# Patient Record
Sex: MALE | Race: WHITE | Employment: OTHER | URBAN - METROPOLITAN AREA
[De-identification: names, ages, dates, MRNs, and addresses within clinical notes are randomized per-mention and may not be internally consistent; named-entity substitution may affect disease eponyms.]

---

## 2020-12-19 ENCOUNTER — APPOINTMENT (OUTPATIENT)
Dept: GENERAL RADIOLOGY | Facility: CLINIC | Age: 75
DRG: 481 | End: 2020-12-19
Attending: EMERGENCY MEDICINE
Payer: MEDICARE

## 2020-12-19 ENCOUNTER — ANESTHESIA EVENT (OUTPATIENT)
Dept: SURGERY | Facility: CLINIC | Age: 75
DRG: 481 | End: 2020-12-19
Payer: MEDICARE

## 2020-12-19 ENCOUNTER — ANESTHESIA (OUTPATIENT)
Dept: SURGERY | Facility: CLINIC | Age: 75
DRG: 481 | End: 2020-12-19
Payer: MEDICARE

## 2020-12-19 ENCOUNTER — HOSPITAL ENCOUNTER (INPATIENT)
Facility: CLINIC | Age: 75
LOS: 3 days | Discharge: NURSING, REHAB OR LONG-TERM CARE | DRG: 481 | End: 2020-12-22
Attending: EMERGENCY MEDICINE | Admitting: STUDENT IN AN ORGANIZED HEALTH CARE EDUCATION/TRAINING PROGRAM
Payer: MEDICARE

## 2020-12-19 ENCOUNTER — APPOINTMENT (OUTPATIENT)
Dept: CT IMAGING | Facility: CLINIC | Age: 75
DRG: 481 | End: 2020-12-19
Attending: EMERGENCY MEDICINE
Payer: MEDICARE

## 2020-12-19 DIAGNOSIS — S72.142A CLOSED DISPLACED INTERTROCHANTERIC FRACTURE OF LEFT FEMUR, INITIAL ENCOUNTER (H): ICD-10-CM
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
## 2020-12-19 DIAGNOSIS — S80.212A KNEE ABRASION, LEFT, INITIAL ENCOUNTER: ICD-10-CM

## 2020-12-19 DIAGNOSIS — Z79.01 LONG TERM CURRENT USE OF ANTICOAGULANT THERAPY: ICD-10-CM

## 2020-12-19 DIAGNOSIS — I48.20 CHRONIC ATRIAL FIBRILLATION (H): ICD-10-CM

## 2020-12-19 PROBLEM — E05.00 TOXIC DIFFUSE GOITER WITHOUT CRISIS: Status: ACTIVE | Noted: 2020-12-19

## 2020-12-19 PROBLEM — G60.9 HEREDITARY AND IDIOPATHIC PERIPHERAL NEUROPATHY: Status: ACTIVE | Noted: 2020-12-19

## 2020-12-19 PROBLEM — R61 EXCESSIVE SWEATING: Status: ACTIVE | Noted: 2020-12-19

## 2020-12-19 PROBLEM — S99.919A INJURY, OTHER AND UNSPECIFIED, KNEE, LEG, ANKLE, AND FOOT: Status: ACTIVE | Noted: 2020-12-19

## 2020-12-19 PROBLEM — E78.5 OTHER AND UNSPECIFIED HYPERLIPIDEMIA: Status: ACTIVE | Noted: 2020-12-19

## 2020-12-19 PROBLEM — H26.9 CATARACT: Status: ACTIVE | Noted: 2020-12-19

## 2020-12-19 PROBLEM — S99.929A INJURY, OTHER AND UNSPECIFIED, KNEE, LEG, ANKLE, AND FOOT: Status: ACTIVE | Noted: 2020-12-19

## 2020-12-19 PROBLEM — R91.1 SOLITARY PULMONARY NODULE: Status: ACTIVE | Noted: 2020-12-19

## 2020-12-19 PROBLEM — R00.2 PALPITATIONS: Status: ACTIVE | Noted: 2020-12-19

## 2020-12-19 PROBLEM — B35.3 TINEA PEDIS: Status: ACTIVE | Noted: 2020-12-19

## 2020-12-19 PROBLEM — S89.90XA INJURY, OTHER AND UNSPECIFIED, KNEE, LEG, ANKLE, AND FOOT: Status: ACTIVE | Noted: 2020-12-19

## 2020-12-19 PROBLEM — H52.4 PRESBYOPIA: Status: ACTIVE | Noted: 2020-12-19

## 2020-12-19 PROBLEM — I71.40 ABDOMINAL AORTIC ANEURYSM (AAA) (H): Status: ACTIVE | Noted: 2020-12-19

## 2020-12-19 PROBLEM — Z13.89 SPECIAL SCREENING FOR OTHER CONDITIONS: Status: ACTIVE | Noted: 2020-12-19

## 2020-12-19 PROBLEM — Z87.891 PERSONAL HISTORY OF TOBACCO USE, PRESENTING HAZARDS TO HEALTH: Status: ACTIVE | Noted: 2020-12-19

## 2020-12-19 PROBLEM — I10 ESSENTIAL HYPERTENSION: Status: ACTIVE | Noted: 2020-12-19

## 2020-12-19 PROBLEM — H52.209 ASTIGMATISM: Status: ACTIVE | Noted: 2020-12-19

## 2020-12-19 PROBLEM — H40.009 BORDERLINE GLAUCOMA: Status: ACTIVE | Noted: 2020-12-19

## 2020-12-19 PROBLEM — E11.9 TYPE 2 DIABETES MELLITUS (H): Status: ACTIVE | Noted: 2020-12-19

## 2020-12-19 PROBLEM — E03.9 HYPOTHYROIDISM: Status: ACTIVE | Noted: 2020-12-19

## 2020-12-19 LAB
ABO + RH BLD: NORMAL
ABO + RH BLD: NORMAL
ANION GAP SERPL CALCULATED.3IONS-SCNC: 7 MMOL/L (ref 3–14)
APTT PPP: 37 SEC (ref 22–37)
BASOPHILS # BLD AUTO: 0 10E9/L (ref 0–0.2)
BASOPHILS NFR BLD AUTO: 0.2 %
BLD GP AB SCN SERPL QL: NORMAL
BLOOD BANK CMNT PATIENT-IMP: NORMAL
BUN SERPL-MCNC: 9 MG/DL (ref 7–30)
CALCIUM SERPL-MCNC: 8.6 MG/DL (ref 8.5–10.1)
CHLORIDE SERPL-SCNC: 106 MMOL/L (ref 94–109)
CO2 SERPL-SCNC: 25 MMOL/L (ref 20–32)
CREAT SERPL-MCNC: 1 MG/DL (ref 0.66–1.25)
DIFFERENTIAL METHOD BLD: ABNORMAL
EOSINOPHIL # BLD AUTO: 0 10E9/L (ref 0–0.7)
EOSINOPHIL NFR BLD AUTO: 0 %
ERYTHROCYTE [DISTWIDTH] IN BLOOD BY AUTOMATED COUNT: 12.6 % (ref 10–15)
FLUAV+FLUBV RNA SPEC QL NAA+PROBE: NEGATIVE
FLUAV+FLUBV RNA SPEC QL NAA+PROBE: NEGATIVE
GFR SERPL CREATININE-BSD FRML MDRD: 74 ML/MIN/{1.73_M2}
GLUCOSE SERPL-MCNC: 116 MG/DL (ref 70–99)
HCT VFR BLD AUTO: 40.5 % (ref 40–53)
HGB BLD-MCNC: 13.6 G/DL (ref 13.3–17.7)
IMM GRANULOCYTES # BLD: 0.1 10E9/L (ref 0–0.4)
IMM GRANULOCYTES NFR BLD: 0.5 %
INR PPP: 1.27 (ref 0.86–1.14)
LABORATORY COMMENT REPORT: NORMAL
LYMPHOCYTES # BLD AUTO: 1.1 10E9/L (ref 0.8–5.3)
LYMPHOCYTES NFR BLD AUTO: 9.8 %
MCH RBC QN AUTO: 35.6 PG (ref 26.5–33)
MCHC RBC AUTO-ENTMCNC: 33.6 G/DL (ref 31.5–36.5)
MCV RBC AUTO: 106 FL (ref 78–100)
MONOCYTES # BLD AUTO: 0.9 10E9/L (ref 0–1.3)
MONOCYTES NFR BLD AUTO: 8.1 %
NEUTROPHILS # BLD AUTO: 9.3 10E9/L (ref 1.6–8.3)
NEUTROPHILS NFR BLD AUTO: 81.4 %
NRBC # BLD AUTO: 0 10*3/UL
NRBC BLD AUTO-RTO: 0 /100
PLATELET # BLD AUTO: 234 10E9/L (ref 150–450)
POTASSIUM SERPL-SCNC: 4.1 MMOL/L (ref 3.4–5.3)
RBC # BLD AUTO: 3.82 10E12/L (ref 4.4–5.9)
RSV RNA SPEC QL NAA+PROBE: NEGATIVE
SARS-COV-2 RNA SPEC QL NAA+PROBE: NEGATIVE
SODIUM SERPL-SCNC: 138 MMOL/L (ref 133–144)
SPECIMEN EXP DATE BLD: NORMAL
SPECIMEN SOURCE: NORMAL
WBC # BLD AUTO: 11.4 10E9/L (ref 4–11)

## 2020-12-19 PROCEDURE — 87636 SARSCOV2 & INF A&B AMP PRB: CPT | Performed by: EMERGENCY MEDICINE

## 2020-12-19 PROCEDURE — 86900 BLOOD TYPING SEROLOGIC ABO: CPT | Performed by: EMERGENCY MEDICINE

## 2020-12-19 PROCEDURE — 120N000001 HC R&B MED SURG/OB

## 2020-12-19 PROCEDURE — 85610 PROTHROMBIN TIME: CPT | Performed by: EMERGENCY MEDICINE

## 2020-12-19 PROCEDURE — 86850 RBC ANTIBODY SCREEN: CPT | Performed by: EMERGENCY MEDICINE

## 2020-12-19 PROCEDURE — 258N000003 HC RX IP 258 OP 636: Performed by: EMERGENCY MEDICINE

## 2020-12-19 PROCEDURE — C9803 HOPD COVID-19 SPEC COLLECT: HCPCS

## 2020-12-19 PROCEDURE — 85730 THROMBOPLASTIN TIME PARTIAL: CPT | Performed by: EMERGENCY MEDICINE

## 2020-12-19 PROCEDURE — 80048 BASIC METABOLIC PNL TOTAL CA: CPT | Performed by: EMERGENCY MEDICINE

## 2020-12-19 PROCEDURE — 250N000013 HC RX MED GY IP 250 OP 250 PS 637: Performed by: STUDENT IN AN ORGANIZED HEALTH CARE EDUCATION/TRAINING PROGRAM

## 2020-12-19 PROCEDURE — 96374 THER/PROPH/DIAG INJ IV PUSH: CPT

## 2020-12-19 PROCEDURE — 71045 X-RAY EXAM CHEST 1 VIEW: CPT

## 2020-12-19 PROCEDURE — 85025 COMPLETE CBC W/AUTO DIFF WBC: CPT | Performed by: EMERGENCY MEDICINE

## 2020-12-19 PROCEDURE — 70450 CT HEAD/BRAIN W/O DYE: CPT

## 2020-12-19 PROCEDURE — 93005 ELECTROCARDIOGRAM TRACING: CPT

## 2020-12-19 PROCEDURE — 99223 1ST HOSP IP/OBS HIGH 75: CPT | Mod: AI | Performed by: STUDENT IN AN ORGANIZED HEALTH CARE EDUCATION/TRAINING PROGRAM

## 2020-12-19 PROCEDURE — 96361 HYDRATE IV INFUSION ADD-ON: CPT

## 2020-12-19 PROCEDURE — 99285 EMERGENCY DEPT VISIT HI MDM: CPT | Mod: 25

## 2020-12-19 PROCEDURE — 250N000011 HC RX IP 250 OP 636: Performed by: EMERGENCY MEDICINE

## 2020-12-19 PROCEDURE — 86901 BLOOD TYPING SEROLOGIC RH(D): CPT | Performed by: EMERGENCY MEDICINE

## 2020-12-19 PROCEDURE — 73502 X-RAY EXAM HIP UNI 2-3 VIEWS: CPT

## 2020-12-19 RX ORDER — LEVOTHYROXINE SODIUM 112 UG/1
112 TABLET ORAL DAILY
Status: DISCONTINUED | OUTPATIENT
Start: 2020-12-20 | End: 2020-12-22 | Stop reason: HOSPADM

## 2020-12-19 RX ORDER — NALOXONE HYDROCHLORIDE 0.4 MG/ML
0.4 INJECTION, SOLUTION INTRAMUSCULAR; INTRAVENOUS; SUBCUTANEOUS
Status: DISCONTINUED | OUTPATIENT
Start: 2020-12-19 | End: 2020-12-22 | Stop reason: HOSPADM

## 2020-12-19 RX ORDER — LACTULOSE 10 G/15ML
10 SOLUTION ORAL 2 TIMES DAILY PRN
COMMUNITY
End: 2020-12-24

## 2020-12-19 RX ORDER — ACETAMINOPHEN 500 MG
500-1000 TABLET ORAL EVERY 8 HOURS PRN
Status: ON HOLD | COMMUNITY
End: 2020-12-21

## 2020-12-19 RX ORDER — SIMVASTATIN 80 MG
TABLET ORAL AT BEDTIME
COMMUNITY

## 2020-12-19 RX ORDER — SIMVASTATIN 40 MG
80 TABLET ORAL AT BEDTIME
Status: DISCONTINUED | OUTPATIENT
Start: 2020-12-19 | End: 2020-12-22 | Stop reason: HOSPADM

## 2020-12-19 RX ORDER — HYDROCODONE BITARTRATE AND ACETAMINOPHEN 5; 325 MG/1; MG/1
1-2 TABLET ORAL EVERY 4 HOURS PRN
Status: DISCONTINUED | OUTPATIENT
Start: 2020-12-19 | End: 2020-12-20 | Stop reason: ALTCHOICE

## 2020-12-19 RX ORDER — NALOXONE HYDROCHLORIDE 0.4 MG/ML
0.2 INJECTION, SOLUTION INTRAMUSCULAR; INTRAVENOUS; SUBCUTANEOUS
Status: DISCONTINUED | OUTPATIENT
Start: 2020-12-19 | End: 2020-12-22 | Stop reason: HOSPADM

## 2020-12-19 RX ORDER — METOPROLOL SUCCINATE 25 MG/1
25 TABLET, EXTENDED RELEASE ORAL DAILY
COMMUNITY

## 2020-12-19 RX ORDER — ACETAMINOPHEN 325 MG/1
650 TABLET ORAL EVERY 4 HOURS PRN
Status: DISCONTINUED | OUTPATIENT
Start: 2020-12-19 | End: 2020-12-19 | Stop reason: ALTCHOICE

## 2020-12-19 RX ORDER — METOPROLOL SUCCINATE 50 MG/1
50 TABLET, EXTENDED RELEASE ORAL DAILY
Status: DISCONTINUED | OUTPATIENT
Start: 2020-12-20 | End: 2020-12-19

## 2020-12-19 RX ORDER — METHOCARBAMOL 500 MG/1
500 TABLET, FILM COATED ORAL 2 TIMES DAILY PRN
COMMUNITY
End: 2021-01-05

## 2020-12-19 RX ORDER — ONDANSETRON 2 MG/ML
4 INJECTION INTRAMUSCULAR; INTRAVENOUS EVERY 6 HOURS PRN
Status: DISCONTINUED | OUTPATIENT
Start: 2020-12-19 | End: 2020-12-20

## 2020-12-19 RX ORDER — SODIUM CHLORIDE 9 MG/ML
INJECTION, SOLUTION INTRAVENOUS ONCE
Status: COMPLETED | OUTPATIENT
Start: 2020-12-19 | End: 2020-12-19

## 2020-12-19 RX ORDER — LIDOCAINE 40 MG/G
CREAM TOPICAL
Status: DISCONTINUED | OUTPATIENT
Start: 2020-12-19 | End: 2020-12-20

## 2020-12-19 RX ORDER — AMOXICILLIN 250 MG
1 CAPSULE ORAL 2 TIMES DAILY
COMMUNITY
End: 2020-12-29

## 2020-12-19 RX ORDER — ACETAMINOPHEN 500 MG
500-1000 TABLET ORAL EVERY 8 HOURS PRN
Status: DISCONTINUED | OUTPATIENT
Start: 2020-12-19 | End: 2020-12-20

## 2020-12-19 RX ORDER — FENTANYL CITRATE 50 UG/ML
50 INJECTION, SOLUTION INTRAMUSCULAR; INTRAVENOUS ONCE
Status: COMPLETED | OUTPATIENT
Start: 2020-12-19 | End: 2020-12-19

## 2020-12-19 RX ORDER — ONDANSETRON 4 MG/1
4 TABLET, ORALLY DISINTEGRATING ORAL EVERY 6 HOURS PRN
Status: DISCONTINUED | OUTPATIENT
Start: 2020-12-19 | End: 2020-12-20

## 2020-12-19 RX ORDER — LISINOPRIL 5 MG/1
2.5 TABLET ORAL DAILY
COMMUNITY
End: 2021-01-27

## 2020-12-19 RX ORDER — SILDENAFIL 100 MG/1
100 TABLET, FILM COATED ORAL DAILY PRN
COMMUNITY

## 2020-12-19 RX ORDER — METOPROLOL SUCCINATE 50 MG/1
50 TABLET, EXTENDED RELEASE ORAL DAILY
Status: DISCONTINUED | OUTPATIENT
Start: 2020-12-19 | End: 2020-12-22 | Stop reason: HOSPADM

## 2020-12-19 RX ORDER — AMLODIPINE BESYLATE 10 MG/1
2.5 TABLET ORAL DAILY
COMMUNITY
End: 2021-01-05

## 2020-12-19 RX ORDER — TAMSULOSIN HYDROCHLORIDE 0.4 MG/1
0.4 CAPSULE ORAL DAILY
COMMUNITY
End: 2020-12-19

## 2020-12-19 RX ORDER — LISINOPRIL 20 MG/1
20 TABLET ORAL DAILY
Status: DISCONTINUED | OUTPATIENT
Start: 2020-12-20 | End: 2020-12-22 | Stop reason: HOSPADM

## 2020-12-19 RX ORDER — AMLODIPINE BESYLATE 10 MG/1
10 TABLET ORAL DAILY
Status: DISCONTINUED | OUTPATIENT
Start: 2020-12-20 | End: 2020-12-22 | Stop reason: HOSPADM

## 2020-12-19 RX ORDER — LEVOTHYROXINE SODIUM 112 UG/1
112 TABLET ORAL DAILY
COMMUNITY

## 2020-12-19 RX ADMIN — METOPROLOL SUCCINATE 50 MG: 50 TABLET, EXTENDED RELEASE ORAL at 14:39

## 2020-12-19 RX ADMIN — SIMVASTATIN 80 MG: 40 TABLET, FILM COATED ORAL at 21:54

## 2020-12-19 RX ADMIN — FENTANYL CITRATE 50 MCG: 50 INJECTION, SOLUTION INTRAMUSCULAR; INTRAVENOUS at 10:01

## 2020-12-19 RX ADMIN — HYDROCODONE BITARTRATE AND ACETAMINOPHEN 2 TABLET: 5; 325 TABLET ORAL at 17:40

## 2020-12-19 RX ADMIN — HYDROCODONE BITARTRATE AND ACETAMINOPHEN 1 TABLET: 5; 325 TABLET ORAL at 13:31

## 2020-12-19 RX ADMIN — SODIUM CHLORIDE: 9 INJECTION, SOLUTION INTRAVENOUS at 08:28

## 2020-12-19 RX ADMIN — HYDROCODONE BITARTRATE AND ACETAMINOPHEN 1 TABLET: 5; 325 TABLET ORAL at 12:34

## 2020-12-19 RX ADMIN — HYDROCODONE BITARTRATE AND ACETAMINOPHEN 2 TABLET: 5; 325 TABLET ORAL at 21:54

## 2020-12-19 ASSESSMENT — ENCOUNTER SYMPTOMS
VOMITING: 0
ROS SKIN COMMENTS: ABRASION
DYSRHYTHMIAS: 1
FEVER: 0
DIARRHEA: 0
NAUSEA: 0
COUGH: 0
CHILLS: 0
ARTHRALGIAS: 1

## 2020-12-19 ASSESSMENT — ACTIVITIES OF DAILY LIVING (ADL)
DIFFICULTY_EATING/SWALLOWING: NO
DIFFICULTY_COMMUNICATING: NO
DRESSING/BATHING_DIFFICULTY: NO
WHICH_OF_THE_ABOVE_FUNCTIONAL_RISKS_HAD_A_RECENT_ONSET_OR_CHANGE?: AMBULATION
ADLS_ACUITY_SCORE: 19
WEAR_GLASSES_OR_BLIND: YES
VISION_MANAGEMENT: GLASSES
ADLS_ACUITY_SCORE: 15
HEARING_DIFFICULTY_OR_DEAF: NO
ADLS_ACUITY_SCORE: 15
TOILETING_ISSUES: NO

## 2020-12-19 NOTE — ED NOTES
[facility]  ED Nurse Handoff Report    ED Chief complaint: Hip Pain and Fall      ED Diagnosis:   Final diagnoses:   Closed displaced intertrochanteric fracture of left femur, initial encounter (H)   Fall, initial encounter   Knee abrasion, left, initial encounter   Long term current use of anticoagulant therapy   Chronic atrial fibrillation (H)       Code Status: Full Code    Allergies: No Known Allergies    Patient Story: Patient fell this AM, left hip fx.  [facility]  Focused Assessment: Alert and oriented, pain is controled.  Previously independent.     Treatments and/or interventions provided: labs, imaging  Patient's response to treatments and/or interventions:   Labs Ordered and Resulted from Time of ED Arrival Up to the Time of Departure from the ED   CBC WITH PLATELETS DIFFERENTIAL - Abnormal; Notable for the following components:       Result Value    WBC 11.4 (*)     RBC Count 3.82 (*)      (*)     MCH 35.6 (*)     Absolute Neutrophil 9.3 (*)     All other components within normal limits   INR - Abnormal; Notable for the following components:    INR 1.27 (*)     All other components within normal limits   BASIC METABOLIC PANEL - Abnormal; Notable for the following components:    Glucose 116 (*)     All other components within normal limits   PARTIAL THROMBOPLASTIN TIME   INFLUENZA A/B & SARS-COV2 PCR MULTIPLEX   PERIPHERAL IV CATHETER   ABO/RH TYPE AND SCREEN         To be done/followed up on inpatient unit:      Does this patient have any cognitive concerns?: none    Activity level - Baseline/Home:  Independent  Activity Level - Current:   Total Care    Patient's Preferred language: English   Needed?: No    Isolation: None  Infection: Not Applicable  Patient tested for COVID 19 prior to admission: YES  Bariatric?: No    Vital Signs:   Vitals:    12/19/20 0823 12/19/20 1000 12/19/20 1002   BP: 100/81 (!) 104/90    Pulse: 98 79    Resp: 20     Temp: 98.8  F  (37.1  C)     TempSrc: Oral     SpO2: 99%  96%       Cardiac Rhythm:     Was the PSS-3 completed:   Yes  What interventions are required if any?               Family Comments:   OBS brochure/video discussed/provided to patient/family: N/A              Name of person given brochure if not patient:               Relationship to patient:     For the majority of the shift this patient's behavior was Green.   Behavioral interventions performed were .    ED NURSE PHONE NUMBER: [id number]

## 2020-12-19 NOTE — ED TRIAGE NOTES
Pt arrives via EMS for evaluation following fall. Pt fell today getting into bed. Pt reports left hip pain. Pt is on blood thinners. Pt denies hitting head. 1mg of Dilaudid given en route to hospital.

## 2020-12-19 NOTE — ED NOTES
Bed: ED26  Expected date:   Expected time:   Means of arrival:   Comments:  Chiquita 423 Fall hip pain  80m

## 2020-12-19 NOTE — ED PROVIDER NOTES
History   Chief Complaint:  Hip Pain and Fall    HPI   Braxton Mantilla is a 75 year old male with history of Xarelto use and ruptured quadriceps who presents with hip pain and fall. Arriving via EMS, the patient was at home in [facility] backing up into his bed when his knees buckled and he fell, resulting in left hip pain. The patient was able to twist and crawl onto the bed. EMS gave the patient 1 mg dilaudid IV. During evaluation, patient is unsure if he bumped his head and is noted to have an abrasion over his left knee, which he attributes to crawling. He denies COVID symptoms, including fever, cough, chills, nausea, emesis, and diarrhea.    Review of Systems   Constitutional: Negative for chills and fever.   Respiratory: Negative for cough.    Gastrointestinal: Negative for diarrhea, nausea and vomiting.   Musculoskeletal: Positive for arthralgias.   Skin:        abrasion   All other systems reviewed and are negative.        Allergies:  No known drug allergies    Medications:  Xarelto    Past Medical History:    Ruptured quadriceps   Anticoagulant use    Social History:  , served in [place] [employer].    Physical Exam     Patient Vitals for the past 24 hrs:   BP Temp Temp src Pulse Resp SpO2 Weight   12/19/20 1331 -- -- -- -- 16 -- --   12/19/20 1305 -- -- -- -- 16 -- --   12/19/20 1234 -- -- -- -- 16 -- --   12/19/20 1150 132/75 98.4  F (36.9  C) Oral 93 16 98 % --   12/19/20 1045 (!) 133/99 -- -- 91 -- -- 65.8 kg (145 lb)   12/19/20 1030 (!) 133/94 -- -- 91 -- -- --   12/19/20 1015 (!) 119/90 -- -- 100 -- -- --   12/19/20 1002 -- -- -- -- -- 96 % --   12/19/20 1000 (!) 104/90 -- -- 79 -- -- --   12/19/20 0823 100/81 98.8  F (37.1  C) Oral 98 20 99 % --       Physical Exam  Nursing note and vitals reviewed.    Constitutional:  Appears comfortable.    HENT:    Nose normal.  No discharge.      Oral mucosa is moist.     No obvious swelling or tenderness to scalp.  Eyes:    Conjunctivae are normal  without injection.     Pupils are equal.  Lymph:  No enlarged or tender cervical or submandibular lymph nodes.   Cardiovascular:  Normal rate, regular rhythm with normal S1 and S2.      Normal heart sounds and peripheral pulses 2+ and equal.       No murmur or petra.  Pulmonary:  Effort normal and breath sounds clear to auscultation bilaterally.     No respiratory distress.  No stridor.     No wheezes. No rales.     GI:    Soft. No distension and no mass. No tenderness.      No rebound and no guarding. No flank pain.  No HSM.  Musculoskeletal:  Left leg is shortened and externally rotated. Tenderness to movement and palpation. Old ecchymosis to lateral proximal thighs.  Neurological:   Alert and oriented. No focal weakness.     Exhibits good muscle tone. Coordination normal.      GCS eye subscore is 4. GCS verbal subscore is 5.      GCS motor subscore is 6.   Skin:    Skin is warm and dry. Abrasion over the left anterior knee. No diaphoresis.      No erythema. No pallor.  No lesions.  Psychiatric:   Behavior is normal. Appropriate mood and affect.     Judgment and thought content normal.     Emergency Department Course   ECG (09:15:27):  Rate 89 bpm. MI interval *. QRS duration 94. QT/QTc 388/472. P-R-T axes * 49 38. Atrial fibrillation with a competing junctional pacemaker. Incomplete right bundle branch block. ST & T wave abnormality, consider anterior ischemia. Abnormal ECG. Interpreted at 0920 by Pilar Logan MD.    Imaging:  XR Chest 1 View  IMPRESSION:   1. Patchy left basilar opacities, may represent atelectasis or pneumonitis and follow-up is recommended to assess for resolution.   2. No pleural effusion or pneumothorax.   3. Cardiomediastinal silhouette unremarkable.     As read by Radiology.    XR Pelvis w Hip Left 1 View  IMPRESSION: Mild to moderate displacement of left intertrochanteric fracture. Hip joint spaces are fairly well preserved. Aortoiliac stent Seen.    As read by Radiology.    CT  Head wo Contrast  IMPRESSION:   1. No acute cranial process.  2. No fractures  3. Mild chronic-appearing ischemic changes intracranially as above,  age appropriate.  4. No extra-axial blood products/fluid collections are noted.    As read by Radiology.    Laboratory:  CBC: WBC 11.4 (H), o/w WNL (HGB 13.6, )  BMP: Glucose 116 (H), o/w WNL (Creatinine 1.00)  INR: 1.27 (H)  PTT: 37  ABO/Rh: ABO O, Rh Pos, Antibody Neg.    Asymptomatic Influenza A/B & COVID-19 Virus PCR Multiplex: Pending     Emergency Department Course:  Reviewed:  I reviewed the patient's nursing notes, vitals, past medical records, Care Everywhere.     Assessments:  0815: I performed an exam of the patient and obtained history, as documented above.  0912: I rechecked the patient. Explained finding to patient.    Consults:  0937: Discussed the patient with orthopedic, Dr. Parisi.  1018: Discussed the patient with hospitalist, Dr. Rea.    Interventions:  0828 Sodium chloride IV  1001 Fentanyl 50 mcg IV    Disposition:  The patient was admitted to the hospital under the care of Dr. Rea.     Impression & Plan   Medical Decision Making:  Patient comes in after falling trying to sit on a chair.  He has pain in his left hip with shortening and external rotation.  X-ray shows a intertrochanteric fracture that is displaced.  Routine labs and EKG are obtained his EKG was unremarkable other than chronic A. fib, basic metabolic panel and CBC unremarkable.  His MCV is elevated at 106 and white count slightly up at 11.4.  I did a head CT because he did think he bumped his head and he is on blood thinners and it was negative.  I talked with the orthopedic surgeon and he will plan to operate on the patient's hip probably in the morning tomorrow.  Dr. Rea is the admitting hospitalist.  He did receive some fentanyl while in the emergency room for pain.    Covid-19  Braxton Mantilla was evaluated during a global COVID-19 pandemic, which necessitated  consideration that the patient might be at risk for infection with the SARS-CoV-2 virus that causes COVID-19.   Applicable protocols for evaluation were followed during the patient's care.   COVID-19 was considered as part of the patient's evaluation. The plan for testing is:  a test was obtained during this visit.    Diagnosis:    ICD-10-CM    1. Closed displaced intertrochanteric fracture of left femur, initial encounter (H)  S72.142A ABO/Rh type and screen     Asymptomatic Influenza A/B & SARS-CoV2 (COVID-19) Virus PCR Multiplex   2. Fall, initial encounter  W19.XXXA    3. Knee abrasion, left, initial encounter  S80.212A    4. Long term current use of anticoagulant therapy  Z79.01    5. Chronic atrial fibrillation (H)  I48.20        Discharge Medications:  Current Discharge Medication List          Scribe Disclosure:  Seema ALEX, am serving as a scribe at 8:15 AM on 12/19/2020 to document services personally performed by Pilar Logan MD based on my observations and the provider's statements to me.       Pilar Logan MD  12/19/20 1388

## 2020-12-19 NOTE — PLAN OF CARE
Arrived on unit from ER at 12 pm.  Alert and oriented x's 4.  Taking Norco for pain with adequate relief.  Bedrest today.  No void since arrival to unit.  Bilateral peripheral neuropathy per patient.  NPO for surgery this evening.  Evening RN to follow.  Will continue to monitor.

## 2020-12-19 NOTE — PHARMACY-ADMISSION MEDICATION HISTORY
Pharmacy Medication History  Admission medication history interview status for the 12/19/2020  admission is complete. See EPIC admission navigator for prior to admission medications       Medication history sources: Patient, Surescripts, Care Everywhere and [facility]  Location of interview: Patient room - had on mask and faceshield  Adherence Assessment: Good    Significant changes made to the medication list:  Added all medications    Additional medication history information:   Medication list is from [facility] and patient was able to verify those medications off of my list. Per the [facility], he is to be taking tamsulosin daily, but the patient states he stopped taking this a few months ago due to a side effect of dizziness.  Also per patient, he has been having difficulty with constipation recently, so he takes 1 tablespoon of lactulose and 1 Senokot tablet every day. If he continues to have constipation for a couple of days he will increase both of them to twice daily until it is under control.     Medication reconciliation completed by provider prior to medication history? No    Time spent in this activity: 20 mins      Prior to Admission medications    Medication Sig Last Dose Taking? Auth Provider   acetaminophen (TYLENOL) 500 MG tablet Take 500-1,000 mg by mouth every 8 hours as needed for mild pain  at prn Yes Unknown, Entered By History   amLODIPine (NORVASC) 10 MG tablet Take 10 mg by mouth daily 12/18/2020 at Unknown time Yes Unknown, Entered By History   lactulose (CHRONULAC) 10 GM/15ML solution Take 10 g by mouth 2 times daily as needed for constipation  at prn Yes Unknown, Entered By History   levothyroxine (SYNTHROID/LEVOTHROID) 112 MCG tablet Take 112 mcg by mouth daily 12/18/2020 at Unknown time Yes Unknown, Entered By History   lisinopril (ZESTRIL) 40 MG tablet Take 20 mg by mouth daily 12/18/2020 at Unknown time Yes Unknown, Entered By History   methocarbamol (ROBAXIN) 500 MG  tablet Take 500 mg by mouth 2 times daily as needed for muscle spasms  at prn Yes Unknown, Entered By History   metoprolol succinate ER (TOPROL-XL) 100 MG 24 hr tablet Take 50 mg by mouth daily 12/18/2020 at Unknown time Yes Unknown, Entered By History   rivaroxaban ANTICOAGULANT (XARELTO) 20 MG TABS tablet Take 20 mg by mouth daily (with dinner) 12/18/2020 at Unknown time Yes Unknown, Entered By History   senna-docusate (SENOKOT-S/PERICOLACE) 8.6-50 MG tablet Take 1 tablet by mouth daily And daily PRN 12/18/2020 at Unknown time Yes Unknown, Entered By History   sildenafil (VIAGRA) 100 MG tablet Take 100 mg by mouth daily as needed  at prn Yes Unknown, Entered By History   simvastatin (ZOCOR) 80 MG tablet Take by mouth At Bedtime 12/18/2020 at Unknown time Yes Unknown, Entered By History       The information provided in this note is only as accurate as the sources available at the time of the update(s).

## 2020-12-19 NOTE — PROGRESS NOTES
RECEIVING UNIT ED HANDOFF REVIEW    ED Nurse Handoff Report was reviewed by: Danielle Lees RN on December 19, 2020 at 11:23 AM

## 2020-12-19 NOTE — PLAN OF CARE
76 yo male with left pertrochanteric hip fracture    - To OR later today if medically optimized per hospitalist team  - NPO   - Bedrest  - Pain control  - Full consult note to follow

## 2020-12-19 NOTE — PROGRESS NOTES
RECEIVING UNIT ED HANDOFF REVIEW    ED Nurse Handoff Report was reviewed by: Lenny Vicente RN on December 19, 2020 at 11:20 AM

## 2020-12-20 ENCOUNTER — APPOINTMENT (OUTPATIENT)
Dept: GENERAL RADIOLOGY | Facility: CLINIC | Age: 75
DRG: 481 | End: 2020-12-20
Attending: ORTHOPAEDIC SURGERY
Payer: MEDICARE

## 2020-12-20 LAB
ANION GAP SERPL CALCULATED.3IONS-SCNC: 5 MMOL/L (ref 3–14)
BUN SERPL-MCNC: 11 MG/DL (ref 7–30)
CALCIUM SERPL-MCNC: 8.6 MG/DL (ref 8.5–10.1)
CHLORIDE SERPL-SCNC: 106 MMOL/L (ref 94–109)
CO2 SERPL-SCNC: 28 MMOL/L (ref 20–32)
CREAT SERPL-MCNC: 0.93 MG/DL (ref 0.66–1.25)
ERYTHROCYTE [DISTWIDTH] IN BLOOD BY AUTOMATED COUNT: 12.8 % (ref 10–15)
GFR SERPL CREATININE-BSD FRML MDRD: 81 ML/MIN/{1.73_M2}
GLUCOSE SERPL-MCNC: 87 MG/DL (ref 70–99)
HCT VFR BLD AUTO: 39.3 % (ref 40–53)
HGB BLD-MCNC: 12.8 G/DL (ref 13.3–17.7)
INTERPRETATION ECG - MUSE: NORMAL
MCH RBC QN AUTO: 35.4 PG (ref 26.5–33)
MCHC RBC AUTO-ENTMCNC: 32.6 G/DL (ref 31.5–36.5)
MCV RBC AUTO: 109 FL (ref 78–100)
PLATELET # BLD AUTO: 190 10E9/L (ref 150–450)
POTASSIUM SERPL-SCNC: 4.3 MMOL/L (ref 3.4–5.3)
RBC # BLD AUTO: 3.62 10E12/L (ref 4.4–5.9)
SODIUM SERPL-SCNC: 139 MMOL/L (ref 133–144)
WBC # BLD AUTO: 8.3 10E9/L (ref 4–11)

## 2020-12-20 PROCEDURE — 80048 BASIC METABOLIC PNL TOTAL CA: CPT | Performed by: STUDENT IN AN ORGANIZED HEALTH CARE EDUCATION/TRAINING PROGRAM

## 2020-12-20 PROCEDURE — 360N000027 HC SURGERY LEVEL 4 EA 15 ADDTL MIN: Performed by: ORTHOPAEDIC SURGERY

## 2020-12-20 PROCEDURE — 761N000001 HC RECOVERY PHASE 1 LEVEL 1 FIRST HR: Performed by: ORTHOPAEDIC SURGERY

## 2020-12-20 PROCEDURE — 258N000003 HC RX IP 258 OP 636: Performed by: ANESTHESIOLOGY

## 2020-12-20 PROCEDURE — 999N000139 HC STATISTIC PRE-PROCEDURE ASSESSMENT II: Performed by: ORTHOPAEDIC SURGERY

## 2020-12-20 PROCEDURE — 250N000011 HC RX IP 250 OP 636: Performed by: ORTHOPAEDIC SURGERY

## 2020-12-20 PROCEDURE — 250N000009 HC RX 250: Performed by: NURSE ANESTHETIST, CERTIFIED REGISTERED

## 2020-12-20 PROCEDURE — 250N000011 HC RX IP 250 OP 636: Performed by: ANESTHESIOLOGY

## 2020-12-20 PROCEDURE — 250N000003 HC SEVOFLURANE, EA 15 MIN: Performed by: ORTHOPAEDIC SURGERY

## 2020-12-20 PROCEDURE — 250N000009 HC RX 250: Performed by: ANESTHESIOLOGY

## 2020-12-20 PROCEDURE — 0QS706Z REPOSITION LEFT UPPER FEMUR WITH INTRAMEDULLARY INTERNAL FIXATION DEVICE, OPEN APPROACH: ICD-10-PCS | Performed by: ORTHOPAEDIC SURGERY

## 2020-12-20 PROCEDURE — 370N000001 HC ANESTHESIA TECHNICAL FEE, 1ST 30 MIN: Performed by: ORTHOPAEDIC SURGERY

## 2020-12-20 PROCEDURE — 250N000011 HC RX IP 250 OP 636: Performed by: NURSE ANESTHETIST, CERTIFIED REGISTERED

## 2020-12-20 PROCEDURE — 99232 SBSQ HOSP IP/OBS MODERATE 35: CPT | Performed by: STUDENT IN AN ORGANIZED HEALTH CARE EDUCATION/TRAINING PROGRAM

## 2020-12-20 PROCEDURE — 761N000002 HC RECOVERY PHASE 1 LEVEL 1 EA ADDTL HR: Performed by: ORTHOPAEDIC SURGERY

## 2020-12-20 PROCEDURE — 360N000030 HC SURGERY LEVEL 4 W FLUORO 1ST 30 MIN: Performed by: ORTHOPAEDIC SURGERY

## 2020-12-20 PROCEDURE — C1769 GUIDE WIRE: HCPCS | Performed by: ORTHOPAEDIC SURGERY

## 2020-12-20 PROCEDURE — C1713 ANCHOR/SCREW BN/BN,TIS/BN: HCPCS | Performed by: ORTHOPAEDIC SURGERY

## 2020-12-20 PROCEDURE — 36415 COLL VENOUS BLD VENIPUNCTURE: CPT | Performed by: STUDENT IN AN ORGANIZED HEALTH CARE EDUCATION/TRAINING PROGRAM

## 2020-12-20 PROCEDURE — 258N000003 HC RX IP 258 OP 636: Performed by: NURSE ANESTHETIST, CERTIFIED REGISTERED

## 2020-12-20 PROCEDURE — 250N000013 HC RX MED GY IP 250 OP 250 PS 637: Performed by: STUDENT IN AN ORGANIZED HEALTH CARE EDUCATION/TRAINING PROGRAM

## 2020-12-20 PROCEDURE — 85027 COMPLETE CBC AUTOMATED: CPT | Performed by: STUDENT IN AN ORGANIZED HEALTH CARE EDUCATION/TRAINING PROGRAM

## 2020-12-20 PROCEDURE — 120N000001 HC R&B MED SURG/OB

## 2020-12-20 PROCEDURE — 370N000002 HC ANESTHESIA TECHNICAL FEE, EACH ADDTL 15 MIN: Performed by: ORTHOPAEDIC SURGERY

## 2020-12-20 PROCEDURE — 272N000001 HC OR GENERAL SUPPLY STERILE: Performed by: ORTHOPAEDIC SURGERY

## 2020-12-20 PROCEDURE — 999N000179 XR SURGERY CARM FLUORO LESS THAN 5 MIN W STILLS

## 2020-12-20 PROCEDURE — 258N000003 HC RX IP 258 OP 636: Performed by: ORTHOPAEDIC SURGERY

## 2020-12-20 PROCEDURE — 250N000009 HC RX 250: Performed by: ORTHOPAEDIC SURGERY

## 2020-12-20 DEVICE — 10MM/130 DEG TI CANN TFNA 170MM - STERILE
Type: IMPLANTABLE DEVICE | Site: HIP | Status: FUNCTIONAL
Brand: TFN-ADVANCE

## 2020-12-20 DEVICE — IMP SCR SYN 5.0 TI LOCK T25 STARDRIVE 40MM 04.005.530S: Type: IMPLANTABLE DEVICE | Site: HIP | Status: FUNCTIONAL

## 2020-12-20 DEVICE — IMPLANTABLE DEVICE: Type: IMPLANTABLE DEVICE | Site: HIP | Status: FUNCTIONAL

## 2020-12-20 RX ORDER — ONDANSETRON 4 MG/1
4 TABLET, ORALLY DISINTEGRATING ORAL EVERY 6 HOURS PRN
Status: DISCONTINUED | OUTPATIENT
Start: 2020-12-20 | End: 2020-12-22 | Stop reason: HOSPADM

## 2020-12-20 RX ORDER — OXYCODONE HYDROCHLORIDE 5 MG/1
5 TABLET ORAL
Status: DISCONTINUED | OUTPATIENT
Start: 2020-12-20 | End: 2020-12-22 | Stop reason: HOSPADM

## 2020-12-20 RX ORDER — ACETAMINOPHEN 325 MG/1
650 TABLET ORAL EVERY 4 HOURS PRN
Status: DISCONTINUED | OUTPATIENT
Start: 2020-12-23 | End: 2020-12-22 | Stop reason: HOSPADM

## 2020-12-20 RX ORDER — ONDANSETRON 4 MG/1
4 TABLET, ORALLY DISINTEGRATING ORAL EVERY 30 MIN PRN
Status: DISCONTINUED | OUTPATIENT
Start: 2020-12-20 | End: 2020-12-20 | Stop reason: HOSPADM

## 2020-12-20 RX ORDER — TRANEXAMIC ACID 10 MG/ML
1 INJECTION, SOLUTION INTRAVENOUS ONCE
Status: COMPLETED | OUTPATIENT
Start: 2020-12-20 | End: 2020-12-20

## 2020-12-20 RX ORDER — FENTANYL CITRATE 50 UG/ML
25-50 INJECTION, SOLUTION INTRAMUSCULAR; INTRAVENOUS
Status: DISCONTINUED | OUTPATIENT
Start: 2020-12-20 | End: 2020-12-20 | Stop reason: HOSPADM

## 2020-12-20 RX ORDER — PROPOFOL 10 MG/ML
INJECTION, EMULSION INTRAVENOUS PRN
Status: DISCONTINUED | OUTPATIENT
Start: 2020-12-20 | End: 2020-12-20

## 2020-12-20 RX ORDER — PROCHLORPERAZINE MALEATE 5 MG
5 TABLET ORAL EVERY 6 HOURS PRN
Status: DISCONTINUED | OUTPATIENT
Start: 2020-12-20 | End: 2020-12-22 | Stop reason: HOSPADM

## 2020-12-20 RX ORDER — NALOXONE HYDROCHLORIDE 0.4 MG/ML
0.4 INJECTION, SOLUTION INTRAMUSCULAR; INTRAVENOUS; SUBCUTANEOUS
Status: DISCONTINUED | OUTPATIENT
Start: 2020-12-20 | End: 2020-12-20

## 2020-12-20 RX ORDER — CEFAZOLIN SODIUM 1 G/3ML
1 INJECTION, POWDER, FOR SOLUTION INTRAMUSCULAR; INTRAVENOUS EVERY 8 HOURS
Status: COMPLETED | OUTPATIENT
Start: 2020-12-21 | End: 2020-12-21

## 2020-12-20 RX ORDER — HYDROMORPHONE HYDROCHLORIDE 1 MG/ML
0.2 INJECTION, SOLUTION INTRAMUSCULAR; INTRAVENOUS; SUBCUTANEOUS
Status: DISCONTINUED | OUTPATIENT
Start: 2020-12-20 | End: 2020-12-22 | Stop reason: HOSPADM

## 2020-12-20 RX ORDER — TRANEXAMIC ACID 650 MG/1
1950 TABLET ORAL ONCE
Status: DISCONTINUED | OUTPATIENT
Start: 2020-12-20 | End: 2020-12-20

## 2020-12-20 RX ORDER — SODIUM CHLORIDE, SODIUM LACTATE, POTASSIUM CHLORIDE, CALCIUM CHLORIDE 600; 310; 30; 20 MG/100ML; MG/100ML; MG/100ML; MG/100ML
INJECTION, SOLUTION INTRAVENOUS CONTINUOUS
Status: DISCONTINUED | OUTPATIENT
Start: 2020-12-20 | End: 2020-12-20 | Stop reason: HOSPADM

## 2020-12-20 RX ORDER — DOCUSATE SODIUM 100 MG/1
100 CAPSULE, LIQUID FILLED ORAL 2 TIMES DAILY
Status: DISCONTINUED | OUTPATIENT
Start: 2020-12-20 | End: 2020-12-22 | Stop reason: HOSPADM

## 2020-12-20 RX ORDER — METOPROLOL TARTRATE 1 MG/ML
INJECTION, SOLUTION INTRAVENOUS PRN
Status: DISCONTINUED | OUTPATIENT
Start: 2020-12-20 | End: 2020-12-20

## 2020-12-20 RX ORDER — LIDOCAINE 40 MG/G
CREAM TOPICAL
Status: DISCONTINUED | OUTPATIENT
Start: 2020-12-20 | End: 2020-12-22 | Stop reason: HOSPADM

## 2020-12-20 RX ORDER — METOPROLOL TARTRATE 1 MG/ML
1 INJECTION, SOLUTION INTRAVENOUS EVERY 5 MIN PRN
Status: DISCONTINUED | OUTPATIENT
Start: 2020-12-20 | End: 2020-12-20 | Stop reason: HOSPADM

## 2020-12-20 RX ORDER — LIDOCAINE HYDROCHLORIDE 20 MG/ML
INJECTION, SOLUTION INFILTRATION; PERINEURAL PRN
Status: DISCONTINUED | OUTPATIENT
Start: 2020-12-20 | End: 2020-12-20

## 2020-12-20 RX ORDER — GABAPENTIN 100 MG/1
100 CAPSULE ORAL AT BEDTIME
Status: DISCONTINUED | OUTPATIENT
Start: 2020-12-20 | End: 2020-12-22 | Stop reason: HOSPADM

## 2020-12-20 RX ORDER — NALOXONE HYDROCHLORIDE 0.4 MG/ML
0.2 INJECTION, SOLUTION INTRAMUSCULAR; INTRAVENOUS; SUBCUTANEOUS
Status: DISCONTINUED | OUTPATIENT
Start: 2020-12-20 | End: 2020-12-20

## 2020-12-20 RX ORDER — OXYCODONE HYDROCHLORIDE 5 MG/1
10 TABLET ORAL EVERY 4 HOURS PRN
Status: DISCONTINUED | OUTPATIENT
Start: 2020-12-20 | End: 2020-12-22 | Stop reason: HOSPADM

## 2020-12-20 RX ORDER — CEFAZOLIN SODIUM 2 G/100ML
2 INJECTION, SOLUTION INTRAVENOUS
Status: COMPLETED | OUTPATIENT
Start: 2020-12-20 | End: 2020-12-20

## 2020-12-20 RX ORDER — AMOXICILLIN 250 MG
1 CAPSULE ORAL 2 TIMES DAILY
Status: DISCONTINUED | OUTPATIENT
Start: 2020-12-20 | End: 2020-12-22 | Stop reason: HOSPADM

## 2020-12-20 RX ORDER — ACETAMINOPHEN 325 MG/1
975 TABLET ORAL EVERY 8 HOURS
Status: DISCONTINUED | OUTPATIENT
Start: 2020-12-20 | End: 2020-12-22 | Stop reason: HOSPADM

## 2020-12-20 RX ORDER — CEFAZOLIN SODIUM 1 G/3ML
1 INJECTION, POWDER, FOR SOLUTION INTRAMUSCULAR; INTRAVENOUS SEE ADMIN INSTRUCTIONS
Status: DISCONTINUED | OUTPATIENT
Start: 2020-12-20 | End: 2020-12-20 | Stop reason: HOSPADM

## 2020-12-20 RX ORDER — HYDROMORPHONE HYDROCHLORIDE 1 MG/ML
0.4 INJECTION, SOLUTION INTRAMUSCULAR; INTRAVENOUS; SUBCUTANEOUS
Status: DISCONTINUED | OUTPATIENT
Start: 2020-12-20 | End: 2020-12-22 | Stop reason: HOSPADM

## 2020-12-20 RX ORDER — SODIUM CHLORIDE, SODIUM LACTATE, POTASSIUM CHLORIDE, CALCIUM CHLORIDE 600; 310; 30; 20 MG/100ML; MG/100ML; MG/100ML; MG/100ML
INJECTION, SOLUTION INTRAVENOUS CONTINUOUS
Status: DISCONTINUED | OUTPATIENT
Start: 2020-12-20 | End: 2020-12-21

## 2020-12-20 RX ORDER — POLYETHYLENE GLYCOL 3350 17 G/17G
17 POWDER, FOR SOLUTION ORAL DAILY
Status: DISCONTINUED | OUTPATIENT
Start: 2020-12-21 | End: 2020-12-22 | Stop reason: HOSPADM

## 2020-12-20 RX ORDER — ONDANSETRON 2 MG/ML
4 INJECTION INTRAMUSCULAR; INTRAVENOUS EVERY 6 HOURS PRN
Status: DISCONTINUED | OUTPATIENT
Start: 2020-12-20 | End: 2020-12-22 | Stop reason: HOSPADM

## 2020-12-20 RX ORDER — ONDANSETRON 2 MG/ML
INJECTION INTRAMUSCULAR; INTRAVENOUS PRN
Status: DISCONTINUED | OUTPATIENT
Start: 2020-12-20 | End: 2020-12-20

## 2020-12-20 RX ORDER — ONDANSETRON 2 MG/ML
4 INJECTION INTRAMUSCULAR; INTRAVENOUS EVERY 30 MIN PRN
Status: DISCONTINUED | OUTPATIENT
Start: 2020-12-20 | End: 2020-12-20 | Stop reason: HOSPADM

## 2020-12-20 RX ORDER — HYDROMORPHONE HYDROCHLORIDE 1 MG/ML
.3-.5 INJECTION, SOLUTION INTRAMUSCULAR; INTRAVENOUS; SUBCUTANEOUS EVERY 5 MIN PRN
Status: DISCONTINUED | OUTPATIENT
Start: 2020-12-20 | End: 2020-12-20 | Stop reason: HOSPADM

## 2020-12-20 RX ORDER — LIDOCAINE 40 MG/G
CREAM TOPICAL
Status: DISCONTINUED | OUTPATIENT
Start: 2020-12-20 | End: 2020-12-20 | Stop reason: HOSPADM

## 2020-12-20 RX ORDER — BISACODYL 10 MG
10 SUPPOSITORY, RECTAL RECTAL DAILY PRN
Status: DISCONTINUED | OUTPATIENT
Start: 2020-12-20 | End: 2020-12-22 | Stop reason: HOSPADM

## 2020-12-20 RX ORDER — FENTANYL CITRATE 50 UG/ML
INJECTION, SOLUTION INTRAMUSCULAR; INTRAVENOUS PRN
Status: DISCONTINUED | OUTPATIENT
Start: 2020-12-20 | End: 2020-12-20

## 2020-12-20 RX ORDER — MAGNESIUM HYDROXIDE 1200 MG/15ML
LIQUID ORAL PRN
Status: DISCONTINUED | OUTPATIENT
Start: 2020-12-20 | End: 2020-12-20 | Stop reason: HOSPADM

## 2020-12-20 RX ADMIN — SODIUM CHLORIDE, POTASSIUM CHLORIDE, SODIUM LACTATE AND CALCIUM CHLORIDE: 600; 310; 30; 20 INJECTION, SOLUTION INTRAVENOUS at 19:50

## 2020-12-20 RX ADMIN — TRANEXAMIC ACID 1 G: 10 INJECTION, SOLUTION INTRAVENOUS at 21:37

## 2020-12-20 RX ADMIN — METOPROLOL TARTRATE 1 MG: 5 INJECTION INTRAVENOUS at 21:56

## 2020-12-20 RX ADMIN — PHENYLEPHRINE HYDROCHLORIDE 100 MCG: 10 INJECTION INTRAVENOUS at 20:28

## 2020-12-20 RX ADMIN — HYDROCODONE BITARTRATE AND ACETAMINOPHEN 2 TABLET: 5; 325 TABLET ORAL at 06:56

## 2020-12-20 RX ADMIN — ONDANSETRON 4 MG: 2 INJECTION INTRAMUSCULAR; INTRAVENOUS at 21:16

## 2020-12-20 RX ADMIN — METOPROLOL TARTRATE 1 MG: 5 INJECTION INTRAVENOUS at 20:27

## 2020-12-20 RX ADMIN — FENTANYL CITRATE 50 MCG: 50 INJECTION, SOLUTION INTRAMUSCULAR; INTRAVENOUS at 21:46

## 2020-12-20 RX ADMIN — PROPOFOL 110 MG: 10 INJECTION, EMULSION INTRAVENOUS at 20:09

## 2020-12-20 RX ADMIN — HYDROMORPHONE HYDROCHLORIDE 0.5 MG: 1 INJECTION, SOLUTION INTRAMUSCULAR; INTRAVENOUS; SUBCUTANEOUS at 22:05

## 2020-12-20 RX ADMIN — HYDROCODONE BITARTRATE AND ACETAMINOPHEN 2 TABLET: 5; 325 TABLET ORAL at 16:26

## 2020-12-20 RX ADMIN — SODIUM CHLORIDE, POTASSIUM CHLORIDE, SODIUM LACTATE AND CALCIUM CHLORIDE: 600; 310; 30; 20 INJECTION, SOLUTION INTRAVENOUS at 22:27

## 2020-12-20 RX ADMIN — METOPROLOL TARTRATE 1 MG: 5 INJECTION INTRAVENOUS at 22:36

## 2020-12-20 RX ADMIN — PHENYLEPHRINE HYDROCHLORIDE 100 MCG: 10 INJECTION INTRAVENOUS at 20:42

## 2020-12-20 RX ADMIN — CEFAZOLIN SODIUM 2 G: 2 INJECTION, SOLUTION INTRAVENOUS at 20:30

## 2020-12-20 RX ADMIN — PHENYLEPHRINE HYDROCHLORIDE 100 MCG: 10 INJECTION INTRAVENOUS at 20:09

## 2020-12-20 RX ADMIN — ROCURONIUM BROMIDE 40 MG: 10 INJECTION INTRAVENOUS at 20:09

## 2020-12-20 RX ADMIN — FENTANYL CITRATE 100 MCG: 50 INJECTION, SOLUTION INTRAMUSCULAR; INTRAVENOUS at 20:09

## 2020-12-20 RX ADMIN — LIDOCAINE HYDROCHLORIDE 100 MG: 20 INJECTION, SOLUTION INFILTRATION; PERINEURAL at 20:09

## 2020-12-20 RX ADMIN — PHENYLEPHRINE HYDROCHLORIDE 100 MCG: 10 INJECTION INTRAVENOUS at 21:04

## 2020-12-20 RX ADMIN — PHENYLEPHRINE HYDROCHLORIDE 100 MCG: 10 INJECTION INTRAVENOUS at 20:24

## 2020-12-20 RX ADMIN — HYDROCODONE BITARTRATE AND ACETAMINOPHEN 2 TABLET: 5; 325 TABLET ORAL at 02:14

## 2020-12-20 RX ADMIN — SUGAMMADEX 200 MG: 100 INJECTION, SOLUTION INTRAVENOUS at 21:25

## 2020-12-20 RX ADMIN — METOPROLOL TARTRATE 1 MG: 5 INJECTION INTRAVENOUS at 22:20

## 2020-12-20 RX ADMIN — SODIUM CHLORIDE, POTASSIUM CHLORIDE, SODIUM LACTATE AND CALCIUM CHLORIDE: 600; 310; 30; 20 INJECTION, SOLUTION INTRAVENOUS at 23:48

## 2020-12-20 RX ADMIN — HYDROMORPHONE HYDROCHLORIDE 0.5 MG: 1 INJECTION, SOLUTION INTRAMUSCULAR; INTRAVENOUS; SUBCUTANEOUS at 21:52

## 2020-12-20 RX ADMIN — PHENYLEPHRINE HYDROCHLORIDE 100 MCG: 10 INJECTION INTRAVENOUS at 20:32

## 2020-12-20 RX ADMIN — HYDROCODONE BITARTRATE AND ACETAMINOPHEN 2 TABLET: 5; 325 TABLET ORAL at 11:54

## 2020-12-20 RX ADMIN — MIDAZOLAM 2 MG: 1 INJECTION INTRAMUSCULAR; INTRAVENOUS at 20:09

## 2020-12-20 RX ADMIN — METOPROLOL TARTRATE 1 MG: 5 INJECTION INTRAVENOUS at 20:37

## 2020-12-20 RX ADMIN — LEVOTHYROXINE SODIUM 112 MCG: 112 TABLET ORAL at 08:22

## 2020-12-20 ASSESSMENT — ACTIVITIES OF DAILY LIVING (ADL)
ADLS_ACUITY_SCORE: 15

## 2020-12-20 NOTE — PLAN OF CARE
Pt A&Ox4, VSS, CMS intact, voiding per urinal, bedrest, tolerating regular diet, Norco for pain management, IV SL, NPO after midnight for OR in am.

## 2020-12-20 NOTE — CONSULTS
"[facility]    Orthopedic Consultation    Braxton Mantilla MRN# [id number]   Age: 75 year old YOB: 1945     Date of Admission:  12/19/2020    Reason for consult: Left peritrochanteric hip fracture       Requesting physician: Desmond       Level of consult: Consult, follow and place orders           Assessment and Plan:   Assessment:   Patient with left peritrochanteric hip fracture  Plant will be to take patient to the OR today if medically cleared and perform ORIF of left hip fracture with TFN. He will then be admitted for pain control and IV antibiotics.       Plan:   See above. Surgery today if medically cleared and then admitted.            Chief Complaint:   Left hip pain after a fall         History of Present Illness:   This patient is a 75 year old male who presents with the following condition requiring a hospital admission:    Patient states he was walking in his basement when his feet became \"tangled\" and he tripped and fell landing on the left hip. He felt immediate pain and was unable to bear weight. He was brought to the ED as a result and we were consulted.           Past Medical History:     Past Medical History:   Diagnosis Date     Benign essential hypertension      Hypothyroidism      Type 2 diabetes mellitus (H)              Past Surgical History:   History reviewed. No pertinent surgical history.          Social History:     Social History     Tobacco Use     Smoking status: Not on file   Substance Use Topics     Alcohol use: Not on file             Family History:     Family History   Problem Relation Age of Onset     No Known Problems Mother      No Known Problems Father              Immunizations:     VACCINE/DOSE   Diptheria   DPT   DTAP   HBIG   Hepatitis A   Hepatitis B   HIB   Influenza   Measles   Meningococcal   MMR   Mumps   Pneumococcal   Polio   Rubella   Small Pox   TDAP   Varicella   Zoster             Allergies:   No Known Allergies          " Medications:     Current Facility-Administered Medications   Medication     acetaminophen (TYLENOL) tablet 500-1,000 mg     amLODIPine (NORVASC) tablet 10 mg     HYDROcodone-acetaminophen (NORCO) 5-325 MG per tablet 1-2 tablet     levothyroxine (SYNTHROID/LEVOTHROID) tablet 112 mcg     lidocaine (LMX4) cream     lidocaine 1 % 0.1-1 mL     lisinopril (ZESTRIL) tablet 20 mg     melatonin tablet 1 mg     metoprolol succinate ER (TOPROL-XL) 24 hr tablet 50 mg     naloxone (NARCAN) injection 0.2 mg    Or     naloxone (NARCAN) injection 0.4 mg    Or     naloxone (NARCAN) injection 0.2 mg    Or     naloxone (NARCAN) injection 0.4 mg     ondansetron (ZOFRAN-ODT) ODT tab 4 mg    Or     ondansetron (ZOFRAN) injection 4 mg     Patient is already receiving anticoagulation with heparin, enoxaparin (LOVENOX), warfarin (COUMADIN)  or other anticoagulant medication     simvastatin (ZOCOR) tablet 80 mg     sodium chloride (PF) 0.9% PF flush 3 mL     sodium chloride (PF) 0.9% PF flush 3 mL             Review of Systems:   CV: NEGATIVE for chest pain, palpitations or peripheral edema  C: NEGATIVE for fever, chills, change in weight  E/M: NEGATIVE for ear, mouth and throat problems  R: NEGATIVE for significant cough or SOB          Physical Exam:   All vitals have been reviewed  Patient Vitals for the past 24 hrs:   BP Temp Temp src Pulse Resp SpO2 Weight   12/20/20 0259 -- -- -- -- 16 -- --   12/20/20 0214 -- -- -- -- 16 -- --   12/20/20 0000 112/69 97.9  F (36.6  C) Oral 100 16 96 % --   12/19/20 2234 -- -- -- -- 16 -- --   12/19/20 2154 -- -- -- -- 18 -- --   12/19/20 1830 -- -- -- -- 18 -- --   12/19/20 1740 -- -- -- -- 16 -- --   12/19/20 1613 111/82 98  F (36.7  C) Oral 88 16 96 % --   12/19/20 1400 -- -- -- -- 16 -- --   12/19/20 1331 -- -- -- -- 16 -- --   12/19/20 1305 -- -- -- -- 16 -- --   12/19/20 1234 -- -- -- -- 16 -- --   12/19/20 1150 132/75 98.4  F (36.9  C) Oral 93 16 98 % --   12/19/20 1045 (!) 133/99 -- -- 91 -- --  65.8 kg (145 lb)   12/19/20 1030 (!) 133/94 -- -- 91 -- -- --   12/19/20 1015 (!) 119/90 -- -- 100 -- -- --   12/19/20 1002 -- -- -- -- -- 96 % --   12/19/20 1000 (!) 104/90 -- -- 79 -- -- --   12/19/20 0823 100/81 98.8  F (37.1  C) Oral 98 20 99 % --       Intake/Output Summary (Last 24 hours) at 12/20/2020 0727  Last data filed at 12/19/2020 1739  Gross per 24 hour   Intake 400 ml   Output 200 ml   Net 200 ml     Patient with bruising about the lateral aspect of the left thigh. He is alert and oriented and communicates clearly with examiner. DF/PF 5/5 in LLE  NVID in the LLE.  Calf s/nt in LLE  LLE is shorted and externally rotated upon exam.   Patient has pain with any movement in the left hip at this point in time.           Data:   All laboratory data reviewed  Results for orders placed or performed during the hospital encounter of 12/19/20   XR Pelvis w Hip Left 1 View     Status: None    Narrative    PELVIS AND HIP LEFT ONE VIEW   12/19/2020 9:01 AM     HISTORY: Pain    COMPARISON: 3/11/2004 x-ray.      Impression    IMPRESSION: Mild to moderate displacement of left intertrochanteric  fracture. Hip joint spaces are fairly well preserved. Aortoiliac stent  seen.    FAUSTO HAILE MD   CT Head w/o Contrast     Status: None    Narrative    CT SCAN OF THE HEAD WITHOUT CONTRAST   12/19/2020 11:03 AM     HISTORY: Fell, bumped head, on Xarelto.    TECHNIQUE: Axial images of the head and coronal reformations without  IV contrast material. Radiation dose for this scan was reduced using  automated exposure control, adjustment of the mA and/or kV according  to patient size, or iterative reconstruction technique.    COMPARISON: None.    FINDINGS:     Intracranial contents: There is no evidence for midline shift mass or  mass effect. No evidence for acute infarction. No extra-axial blood  products or fluid collections. Mild generalized cerebral and  cerebellar parenchymal volume loss, age appropriate. Mild chronic  small  vessel ischemic/degenerative changes of aging are identified in  the deep white matter both cerebral hemispheres. No extra-axial blood  products or fluid collections are noted. Satisfactory position of the  cerebellar tonsils. Sella appears within normal limits.    There is no evidence of intracranial hemorrhage, mass, acute infarct  or anomaly.    Visualized orbits/sinuses/mastoids: Paranasal sinuses and mastoid air  cells are clear with no air-fluid levels. Debris/cerumen in left EAC  suggested. Degenerative changes both TMJs. No acute intraorbital  process.    Osseous structures/soft tissues: No evidence for acute fracture of the  calvarium or skull base. No significant swelling of the facial or  scalp tissues is apparent. No significant scalp hematoma is  identified.      Impression    IMPRESSION:   1. No acute intracranial process.  2. No fractures  3. Mild chronic-appearing ischemic changes intracranially as above,  age appropriate.  4. No extra-axial blood products/fluid collections are noted.      KEVON FENG MD   XR Chest 1 View     Status: None    Narrative    XR CHEST 1    12/19/2020 9:01 AM     HISTORY: Fall, possible broken hip    COMPARISON: None.      Impression    IMPRESSION:   1. Patchy left basilar opacities, may represent atelectasis or  pneumonitis and follow-up is recommended to assess for resolution.  2. No pleural effusion or pneumothorax.  3. Cardiomediastinal silhouette unremarkable.    MARBIN SZYMANSKI MD   CBC with platelets differential     Status: Abnormal   Result Value Ref Range    WBC 11.4 (H) 4.0 - 11.0 10e9/L    RBC Count 3.82 (L) 4.4 - 5.9 10e12/L    Hemoglobin 13.6 13.3 - 17.7 g/dL    Hematocrit 40.5 40.0 - 53.0 %     (H) 78 - 100 fl    MCH 35.6 (H) 26.5 - 33.0 pg    MCHC 33.6 31.5 - 36.5 g/dL    RDW 12.6 10.0 - 15.0 %    Platelet Count 234 150 - 450 10e9/L    Diff Method Automated Method     % Neutrophils 81.4 %    % Lymphocytes 9.8 %    % Monocytes 8.1 %    %  Eosinophils 0.0 %    % Basophils 0.2 %    % Immature Granulocytes 0.5 %    Nucleated RBCs 0 0 /100    Absolute Neutrophil 9.3 (H) 1.6 - 8.3 10e9/L    Absolute Lymphocytes 1.1 0.8 - 5.3 10e9/L    Absolute Monocytes 0.9 0.0 - 1.3 10e9/L    Absolute Eosinophils 0.0 0.0 - 0.7 10e9/L    Absolute Basophils 0.0 0.0 - 0.2 10e9/L    Abs Immature Granulocytes 0.1 0 - 0.4 10e9/L    Absolute Nucleated RBC 0.0    INR     Status: Abnormal   Result Value Ref Range    INR 1.27 (H) 0.86 - 1.14   Partial thromboplastin time     Status: None   Result Value Ref Range    PTT 37 22 - 37 sec   Basic metabolic panel     Status: Abnormal   Result Value Ref Range    Sodium 138 133 - 144 mmol/L    Potassium 4.1 3.4 - 5.3 mmol/L    Chloride 106 94 - 109 mmol/L    Carbon Dioxide 25 20 - 32 mmol/L    Anion Gap 7 3 - 14 mmol/L    Glucose 116 (H) 70 - 99 mg/dL    Urea Nitrogen 9 7 - 30 mg/dL    Creatinine 1.00 0.66 - 1.25 mg/dL    GFR Estimate 74 >60 mL/min/[1.73_m2]    GFR Estimate If Black 86 >60 mL/min/[1.73_m2]    Calcium 8.6 8.5 - 10.1 mg/dL   Asymptomatic Influenza A/B & SARS-CoV2 (COVID-19) Virus PCR Multiplex     Status: None    Specimen: Nasopharyngeal   Result Value Ref Range    Flu A/B & SARS-COV-2 PCR Source Nasopharyngeal     SARS-CoV-2 PCR Result NEGATIVE     Influenza A PCR Negative NEG^Negative    Influenza B PCR Negative NEG^Negative    Respiratory Syncytial Virus PCR Negative NEG^Negative    Flu A/B & SARS-CoV-2 PCR Comment (Note)    EKG 12-lead, tracing only     Status: None   Result Value Ref Range    Interpretation ECG Click View Image link to view waveform and result    ABO/Rh type and screen     Status: None   Result Value Ref Range    ABO O     RH(D) Pos     Antibody Screen Neg     Test Valid Only At [facility]        Specimen Expires 12/22/2020           Attestation:  I have reviewed today's vital signs, notes, medications, labs and imaging with Dr. Parisi.  Amount of time performed on this consult: 30  minutes.    Aaron Johnston PA-C

## 2020-12-20 NOTE — PLAN OF CARE
A&O.  VSS, ex soft BP.  CMS intact.  Pain managed with norco.  Bedrest.  Voiding adequate amount in urinal.  NPO since midnight, pt agitated and anxious.  surgery pushed back till PM, no time yet.

## 2020-12-20 NOTE — PLAN OF CARE
Patient is A&O and pleasant, VSS on RA, CMS intact, NPO since midnight, voiding in the urinal, and bedrest. Norco given for pain control. Surgery is at 10 am, will continue to monitor

## 2020-12-20 NOTE — ANESTHESIA PREPROCEDURE EVALUATION
Anesthesia Pre-Procedure Evaluation    Patient: Braxton Mantilla   MRN: [id number] : 1945          Preoperative Diagnosis: Hip fracture (H) [S72.009A]    Procedure(s):  INTERNAL FIXATION, FRACTURE, TROCHANTERIC, HIP, USING PINS OR RODS    Past Medical History:   Diagnosis Date     Benign essential hypertension      Hypothyroidism      Type 2 diabetes mellitus (H)      History reviewed. No pertinent surgical history.     EKG - afib with incomplete RBBB, ST and T wave abnormality consider anterior ischemia    Anesthesia Evaluation     .             ROS/MED HX    ENT/Pulmonary:       Neurologic: Comment: Head CT - no acute injury    (+)neuropathy     Cardiovascular: Comment: AAA    (+) Dyslipidemia, hypertension-Peripheral Vascular Disease (AAA)---. Taking blood thinners : . . . :. dysrhythmias a-fib, Irregular Heartbeat/Palpitations, .       METS/Exercise Tolerance:     Hematologic:         Musculoskeletal: Comment: Left intertrochanteric femur fracture - mechanical fall without LOC  (+) fracture lower extremity: Hip, -       GI/Hepatic:  - neg GI/hepatic ROS      (-) GERD   Renal/Genitourinary:  - ROS Renal section negative       Endo:     (+) type II DM thyroid problem hypothyroidism, .      Psychiatric:         Infectious Disease:         Malignancy:         Other:                          Physical Exam  Normal systems: cardiovascular and pulmonary    Airway   Mallampati: II  TM distance: >3 FB  Neck ROM: full    Dental   (+) upper dentures    Cardiovascular   Rhythm and rate: irregular and normal      Pulmonary    breath sounds clear to auscultation            Lab Results   Component Value Date    WBC 11.4 (H) 2020    HGB 13.6 2020    HCT 40.5 2020     2020     2020    POTASSIUM 4.1 2020    CHLORIDE 106 2020    CO2 25 2020    BUN 9 2020    CR 1.00 2020     (H) 2020    FADI 8.6 2020    PTT 37 2020    INR 1.27 (H)  12/19/2020       Preop Vitals  BP Readings from Last 3 Encounters:   12/19/20 111/82    Pulse Readings from Last 3 Encounters:   12/19/20 88      Resp Readings from Last 3 Encounters:   12/19/20 18    SpO2 Readings from Last 3 Encounters:   12/19/20 96%      Temp Readings from Last 1 Encounters:   12/19/20 36.7  C (98  F) (Oral)    Ht Readings from Last 1 Encounters:   No data found for Ht      Wt Readings from Last 1 Encounters:   12/19/20 65.8 kg (145 lb)    There is no height or weight on file to calculate BMI.       Anesthesia Plan      History & Physical Review  History and physical reviewed and following examination; no interval change.    ASA Status:  3 .    NPO Status:  > 8 hours    Plan for General with Propofol induction. Maintenance will be Balanced.    PONV prophylaxis:  Ondansetron (or other 5HT-3)         Postoperative Care  Postoperative pain management:  IV analgesics and Multi-modal analgesia.      Consents  Anesthetic plan, risks, benefits and alternatives discussed with:  Patient..                 Juwan Martinez MD

## 2020-12-21 ENCOUNTER — APPOINTMENT (OUTPATIENT)
Dept: PHYSICAL THERAPY | Facility: CLINIC | Age: 75
DRG: 481 | End: 2020-12-21
Attending: ORTHOPAEDIC SURGERY
Payer: MEDICARE

## 2020-12-21 LAB
GLUCOSE SERPL-MCNC: 96 MG/DL (ref 70–99)
HGB BLD-MCNC: 12.2 G/DL (ref 13.3–17.7)
LACTATE BLD-SCNC: 1.4 MMOL/L (ref 0.7–2)

## 2020-12-21 PROCEDURE — 85018 HEMOGLOBIN: CPT | Performed by: ORTHOPAEDIC SURGERY

## 2020-12-21 PROCEDURE — 250N000013 HC RX MED GY IP 250 OP 250 PS 637: Performed by: STUDENT IN AN ORGANIZED HEALTH CARE EDUCATION/TRAINING PROGRAM

## 2020-12-21 PROCEDURE — 97161 PT EVAL LOW COMPLEX 20 MIN: CPT | Mod: GP | Performed by: PHYSICAL THERAPIST

## 2020-12-21 PROCEDURE — 250N000013 HC RX MED GY IP 250 OP 250 PS 637: Performed by: ORTHOPAEDIC SURGERY

## 2020-12-21 PROCEDURE — 82947 ASSAY GLUCOSE BLOOD QUANT: CPT | Performed by: ORTHOPAEDIC SURGERY

## 2020-12-21 PROCEDURE — 258N000003 HC RX IP 258 OP 636: Performed by: ORTHOPAEDIC SURGERY

## 2020-12-21 PROCEDURE — 250N000011 HC RX IP 250 OP 636: Performed by: ORTHOPAEDIC SURGERY

## 2020-12-21 PROCEDURE — 83605 ASSAY OF LACTIC ACID: CPT | Performed by: ORTHOPAEDIC SURGERY

## 2020-12-21 PROCEDURE — 97110 THERAPEUTIC EXERCISES: CPT | Mod: GP | Performed by: PHYSICAL THERAPIST

## 2020-12-21 PROCEDURE — 97116 GAIT TRAINING THERAPY: CPT | Mod: GP | Performed by: PHYSICAL THERAPIST

## 2020-12-21 PROCEDURE — 36415 COLL VENOUS BLD VENIPUNCTURE: CPT | Performed by: ORTHOPAEDIC SURGERY

## 2020-12-21 PROCEDURE — 120N000001 HC R&B MED SURG/OB

## 2020-12-21 PROCEDURE — 97530 THERAPEUTIC ACTIVITIES: CPT | Mod: GP | Performed by: PHYSICAL THERAPIST

## 2020-12-21 PROCEDURE — 99232 SBSQ HOSP IP/OBS MODERATE 35: CPT | Performed by: STUDENT IN AN ORGANIZED HEALTH CARE EDUCATION/TRAINING PROGRAM

## 2020-12-21 RX ORDER — OXYCODONE HYDROCHLORIDE 5 MG/1
5-10 TABLET ORAL EVERY 4 HOURS PRN
Qty: 30 TABLET | Refills: 0 | Status: SHIPPED | OUTPATIENT
Start: 2020-12-21 | End: 2020-12-23

## 2020-12-21 RX ORDER — ONDANSETRON 4 MG/1
4 TABLET, ORALLY DISINTEGRATING ORAL EVERY 6 HOURS PRN
Qty: 8 TABLET | Refills: 0 | DISCHARGE
Start: 2020-12-21 | End: 2021-01-05

## 2020-12-21 RX ORDER — ACETAMINOPHEN 325 MG/1
650 TABLET ORAL EVERY 4 HOURS PRN
Qty: 30 TABLET | Refills: 0 | Status: SHIPPED | OUTPATIENT
Start: 2020-12-23 | End: 2021-01-05

## 2020-12-21 RX ADMIN — ACETAMINOPHEN 975 MG: 325 TABLET, FILM COATED ORAL at 08:09

## 2020-12-21 RX ADMIN — OXYCODONE HYDROCHLORIDE 10 MG: 5 TABLET ORAL at 19:04

## 2020-12-21 RX ADMIN — CEFAZOLIN 1 G: 1 INJECTION, POWDER, FOR SOLUTION INTRAMUSCULAR; INTRAVENOUS at 12:15

## 2020-12-21 RX ADMIN — GABAPENTIN 100 MG: 100 CAPSULE ORAL at 00:00

## 2020-12-21 RX ADMIN — LISINOPRIL 20 MG: 20 TABLET ORAL at 08:10

## 2020-12-21 RX ADMIN — DOCUSATE SODIUM 50 MG AND SENNOSIDES 8.6 MG 1 TABLET: 8.6; 5 TABLET, FILM COATED ORAL at 08:11

## 2020-12-21 RX ADMIN — METOPROLOL SUCCINATE 50 MG: 50 TABLET, EXTENDED RELEASE ORAL at 08:10

## 2020-12-21 RX ADMIN — DOCUSATE SODIUM 100 MG: 100 CAPSULE, LIQUID FILLED ORAL at 08:10

## 2020-12-21 RX ADMIN — GABAPENTIN 100 MG: 100 CAPSULE ORAL at 21:36

## 2020-12-21 RX ADMIN — AMLODIPINE BESYLATE 10 MG: 10 TABLET ORAL at 08:11

## 2020-12-21 RX ADMIN — LEVOTHYROXINE SODIUM 112 MCG: 112 TABLET ORAL at 08:10

## 2020-12-21 RX ADMIN — SIMVASTATIN 80 MG: 40 TABLET, FILM COATED ORAL at 21:36

## 2020-12-21 RX ADMIN — CEFAZOLIN 1 G: 1 INJECTION, POWDER, FOR SOLUTION INTRAMUSCULAR; INTRAVENOUS at 04:25

## 2020-12-21 RX ADMIN — ACETAMINOPHEN 975 MG: 325 TABLET, FILM COATED ORAL at 00:00

## 2020-12-21 RX ADMIN — DOCUSATE SODIUM 100 MG: 100 CAPSULE, LIQUID FILLED ORAL at 21:36

## 2020-12-21 RX ADMIN — SODIUM CHLORIDE, POTASSIUM CHLORIDE, SODIUM LACTATE AND CALCIUM CHLORIDE: 600; 310; 30; 20 INJECTION, SOLUTION INTRAVENOUS at 09:11

## 2020-12-21 RX ADMIN — OXYCODONE HYDROCHLORIDE 5 MG: 5 TABLET ORAL at 08:16

## 2020-12-21 RX ADMIN — POLYETHYLENE GLYCOL 3350 17 G: 17 POWDER, FOR SOLUTION ORAL at 08:11

## 2020-12-21 RX ADMIN — ACETAMINOPHEN 975 MG: 325 TABLET, FILM COATED ORAL at 19:04

## 2020-12-21 RX ADMIN — DOCUSATE SODIUM 50 MG AND SENNOSIDES 8.6 MG 1 TABLET: 8.6; 5 TABLET, FILM COATED ORAL at 21:36

## 2020-12-21 RX ADMIN — RIVAROXABAN 10 MG: 10 TABLET, FILM COATED ORAL at 08:10

## 2020-12-21 ASSESSMENT — ACTIVITIES OF DAILY LIVING (ADL)
ADLS_ACUITY_SCORE: 15

## 2020-12-21 NOTE — ANESTHESIA CARE TRANSFER NOTE
Patient: Braxton Mantilla    Procedure(s):  INTERNAL FIXATION, FRACTURE, TROCHANTERIC, HIP, USING PINS OR RODS    Diagnosis: Hip fracture (H) [S72.009A]  Diagnosis Additional Information: No value filed.    Anesthesia Type:   General     Note:  Airway :Face Mask  Patient transferred to:PACU  Comments: Transferred to PACU, spontaneous respirations, 10L oxygen via facemask.  All monitors and alarms on and functioning, VSS.  Patient awake, comfortable.  Report to PACU RN.Handoff Report: Identifed the Patient, Identified the Reponsible Provider, Reviewed the pertinent medical history, Discussed the surgical course, Reviewed Intra-OP anesthesia mangement and issues during anesthesia, Set expectations for post-procedure period and Allowed opportunity for questions and acknowledgement of understanding      Vitals: (Last set prior to Anesthesia Care Transfer)    CRNA VITALS  12/20/2020 2101 - 12/20/2020 2139 12/20/2020             Pulse:  113    Ht Rate:  118    SpO2:  100 %    Resp Rate (observed):  (!) 6                Electronically Signed By: CECILIA Bryan CRNA  December 20, 2020  9:39 PM

## 2020-12-21 NOTE — PLAN OF CARE
Patient return back from surgery around 11:15 pm, VSS on 2L NC/Capno, CMS intact, regular diet, but only had apple juice, and voiding in the urinal. Dressing CDI, IVF infusing, and on tele. Will continue to monitor

## 2020-12-21 NOTE — PROGRESS NOTES
"   12/21/20 0827   Quick Adds   Type of Visit Initial PT Evaluation   Living Environment   People in home other (see comments)  (Roommate)   Current Living Arrangements house   Home Accessibility stairs to enter home;stairs within home   Number of Stairs, Main Entrance 3   Stair Railings, Main Entrance railing on left side (ascending)   Number of Stairs, Within Home, Primary   (16)   Stair Railings, Within Home, Primary railings on both sides of stairs   Transportation Anticipated car, drives self   Living Environment Comments Pt reports no assist upon discharge.    Self-Care   Usual Activity Tolerance good   Current Activity Tolerance fair   Regular Exercise No   Equipment Currently Used at Home none   Disability/Function   Fall history within last six months yes   Number of times patient has fallen within last six months 2   General Information   Onset of Illness/Injury or Date of Surgery 12/19/20   Referring Physician Beltran Rea MD   Patient/Family Therapy Goals Statement (PT) \"I can't go home like this.\"   Pertinent History of Current Problem (include personal factors and/or comorbidities that impact the POC) 74 y/o male POD # 1 cephalomedullary nail of L intertrochanteric hip fracture.    Existing Precautions/Restrictions fall   Weight-Bearing Status - LLE weight-bearing as tolerated   General Observations Ambulate with assist.    Cognition   Orientation Status (Cognition) oriented x 3   Affect/Mental Status (Cognition) WFL   Follows Commands (Cognition) WFL   Pain Assessment   Patient Currently in Pain   (L hip pain at rest: 6/10)   Integumentary/Edema   Integumentary/Edema Comments L hip incision sites covered with dressing.    Posture    Posture Comments Noted forward head and shoulder posture upon sitting EOB and standing at FWW.    Range of Motion (ROM)   ROM Comment Limtied L hip ROM secondary to pain, otherwise B LEs WFL.    Strength   Strength Comments Not formally assessed.    Bed Mobility   Comment " (Bed Mobility) Supine-sit with modA.    Transfers   Transfer Safety Comments Sit <> stand with FWW and modA.    Gait/Stairs (Locomotion)   Comment (Gait/Stairs) Pt amb 5' with FWW and Avi.    Balance   Balance Comments Noted good seated and standing balance at FWW.    Sensory Examination   Sensory Perception Comments Pt reports numbness/tingling in B LEs to mid calf d/t neuropathy at baseline.   Clinical Impression   Criteria for Skilled Therapeutic Intervention yes, treatment indicated   PT Diagnosis (PT) Difficulty with functional mobility.    Influenced by the following impairments Pain, Impaired L hip ROM, Decreased strength, Decreased activity tolerance   Functional limitations due to impairments Limited functional mobility requiring AD and assist.    Clinical Presentation Stable/Uncomplicated   Clinical Presentation Rationale Based on PMH, current presentation, and social support.    Clinical Decision Making (Complexity) low complexity   Therapy Frequency (PT) Daily   Predicted Duration of Therapy Intervention (days/wks) 3 days   Planned Therapy Interventions (PT) bed mobility training;gait training;ROM (range of motion);stair training;strengthening;transfer training   Risk & Benefits of therapy have been explained patient   PT Discharge Planning    PT Discharge Recommendation (DC Rec) Transitional Care Facility   PT Rationale for DC Rec Pt is below baseline, currently requiring assist of 1 with use of FWW for limited functional mobility. Pt is unable to tolerated functional ambulation distances at this time.    Total Evaluation Time   Total Evaluation Time (Minutes) 5

## 2020-12-21 NOTE — OP NOTE
[facility]   Operative Note    Pre-operative diagnosis:  Left intertrochanteric hip fracture   Post-operative diagnosis  same   Procedure:  Cephalomedullary nailing of left intertrochanteric hip fracture   Surgeon(s): Surgeon(s) and Role:     * Yemi Parisi MD - Primary   Jennifer Gipson PA-C.  The PA was present for the entirety of the case and was essential for patient positioning soft tissue retraction wound closure and patient transport   Estimated blood loss: 25 mL    Specimens: * No specimens in log *   Findings:  Left intertrochanteric hip fracture     Indications: This is a 75-year-old male who presented to the emergency department after having a ground-level fall.  He was unable to bear weight on the left hip due to significant pain.  He was seen in our emergency department and eventually admitted to the medicine service where he was optimized prior to surgery.  I had a long discussion with the patient regarding surgical intervention for hip fractures.  He elected to undergo the above-mentioned procedure after risks benefits and alternatives were discussed.    Description of procedure:   The patient was met in the preoperative area and the operative site was signed by myself.  Preoperative antibiotics and preoperative TXA were given.  The patient was brought back to the operating room was placed in the supine position on the operating room table after undergoing adequate anesthesia.  All bony prominences were padded.  A timeout was then called ensuring we are operating on the correct site and the correct patient.  All staff concerns were addressed at this time.  X-ray was used and the [place] table was positioned so that the fracture was reduced.  The patient was then prepped and draped in normal sterile fashion.  An incision was made just proximal to the greater trochanter and a guidepin was entered into the greater trochanter.  Pin position was confirmed via AP and lateral  views.  An entry reamer was then used over the guidewire to enter the proximal femur.  A short Synthes TFN a nail was then placed in the femoral canal.  Once we had appropriate position we then used the jig to place a guidewire through a separate lateral incision into the femoral head.  We confirmed this position on both AP and lateral views.  We then measured our femoral head screw.  This was a size 120 mm.  We then placed the screw after drilling over the guidewire.  We then compressed through the nail.  We locked the device proximally.  We then placed another guide tube through the jig and placed a distal interlock screw.  We then removed the jig and took final x-rays both AP and lateral of the femur.  This demonstrated an anatomically reduced left proximal femur fracture with cephalomedullary nail in place.  Then thoroughly irrigated all the wounds and closed in a layered fashion using 2-0 Vicryl and staples.  Local anesthetic was placed around the wounds.  The patient was then transferred off the operating table and brought back to the postanesthesia care unit where he awoke without any difficulty.    All counts were correct at the end of the case.    Postoperative plan: The patient will be weightbearing as tolerated on the left lower extremity.  He will return to his baseline Xarelto for DVT prophylaxis.  He will work with our physical therapy department to determine if he is suitable for return home or if he needs to go to rehab.  He will see me in my clinic in approximately 2 to 3 weeks for follow-up.  If he is in a rehab facility his staples can be removed there and I can see him via zoom visit.

## 2020-12-21 NOTE — PLAN OF CARE
DATE & TIME: 12/21/2020, 7311-0597  Cognitive Concerns/ Orientation: A&Ox4.  BEHAVIOR & AGGRESSION TOOL COLOR: Green.  CIWA SCORE: N/A   ABNL VS/O2: VSS on RA.  MOBILITY: Weight bearing as tolerated. Hip procedure. Assist of 1 w/ GB and walker.   PAIN MANAGMENT: PRN Oxy and scheduled Tylenol.  DIET: Regular.   BOWEL/BLADDER: Voiding in urinal, no BM this shift. Passing flatus.  ABNL LAB/BG: Hgb: 12.2.  DRAIN/DEVICES: IV in R hand LR infusing at 100mL/hr.  TELEMETRY RHYTHM: Afib- CVR.  SKIN: Bruised, incisions on left hip.  TESTS/PROCEDURES: N/A  D/C DAY/GOALS/PLACE: Pending placement.  OTHER IMPORTANT INFO: Pt made sexual remarks to rehab during her visit today. Pt is easily irritated.

## 2020-12-21 NOTE — PLAN OF CARE
Discharge Planner OT   Patient plan for discharge: TCU  Current status: Orders received and chart reviewed. Pt. is 76 y/o male POD # 1 cephalomedullary nail of L intertrochanteric hip fracture. Pt. Resides w/ roommate. Per PT, pt. needing mod. A for sit-stand, min. A for short distance ambulation. PT recommending TCU. In order to best utilize OT services, will defre to next level of care/TCU. PT will continue to see daily/address therapy needs while hospitalized. Will complete order, sign-off.  Barriers to return to prior living situation: Defer to PT  Recommendations for discharge: Defer to PT  Rationale for recommendations: Will defer OT services to TCU setting.       Entered by: Stacy Dill 12/21/2020 5:03 PM

## 2020-12-21 NOTE — ANESTHESIA PROCEDURE NOTES
Airway   Date/Time: 12/20/2020 8:11 PM   Patient location during procedure: OR    Staff -   Anesthesiologist:  Jed Anaya MD  CRNA: Evelin Patel APRN CRNA  Performed By: anesthesiologist and CRNA    Indications and Patient Condition  Indications for airway management: kaci-procedural  Induction type:intravenousMask difficulty assessment: 2 - vent by mask + OA or adjuvant +/- NMBA    Final Airway Details  Final airway type: endotracheal airway  Successful airway:ETT - single and Oral  Endotracheal Airway Details   ETT size (mm): 8.0  Cuffed: yes  Successful intubation technique: direct laryngoscopy  Grade View of Cords: 1  Adjucts: stylet  Measured from: lips  Secured at (cm): 26  Secured with: pink tape  Bite block used: None    Post intubation assessment   Placement verified by: capnometry, equal breath sounds and chest rise   Number of attempts at approach: 1  Secured with:pink tape  Ease of procedure: easy  Dentition: Intact and Unchanged

## 2020-12-21 NOTE — CONSULTS
Care Management Initial Consult    General Information  Assessment completed with: Patient, Sanjeev and [facility]  Type of CM/SW Visit: Initial Assessment    Primary Care Provider verified and updated as needed: Yes   Readmission within the last 30 days: no previous admission in last 30 days   Return Category: New Diagnosis  Reason for Consult: discharge planning  Advance Care Planning:            Communication Assessment  Patient's communication style: spoken language (English or Bilingual)    Hearing Difficulty or Deaf: no   Wear Glasses or Blind: yes    Cognitive  Cognitive/Neuro/Behavioral: WDL  Level of Consciousness: alert     Orientation: oriented x 4  Mood/Behavior: calm, cooperative          Living Environment:   People in home: alone     Current living Arrangements: house      Able to return to prior arrangements:         Family/Social Support:  Care provided by: self  Provides care for: no one  Marital Status:   Sibling(s)          Description of Support System: Supportive         Current Resources:   Skilled Home Care Services:    Community Resources:    Equipment currently used at home: none  Supplies currently used at home:      Employment/Financial:  Employment Status: retired        Financial Concerns: No concerns identified           Lifestyle & Psychosocial Needs:        Socioeconomic History     Marital status:      Spouse name: Not on file     Number of children: Not on file     Years of education: Not on file     Highest education level: Not on file     Tobacco Use     Smoking status: Former Smoker     Smokeless tobacco: Current User   Substance and Sexual Activity     Drug use: Never       Functional Status:  Prior to admission patient needed assistance:              Mental Health Status:          Chemical Dependency Status:                Values/Beliefs:  Spiritual, Cultural Beliefs, Synagogue Practices, Values that affect care:                 Additional Information:  Care  Management consult for discharge planning. Patient admitted on 12/19/20 for hip fracture with a tentative discharge date of 12/22/20. Reviewed chart and recommendaitons for TCU at discharge. Writer was notified that [facility] and [facility]. Call placed to Yuliana with [facility] regarding this request. She informed writer that at this time, writer can call the referral line but to her knowledge the [facility]/TCU is not open due to COVID. She also noted that patient is not eligible foe the community eligibility programs because he is not highly service connected. Writer was also told that because patient has Medicare Part A that would be billed for TCU if in the community.     Call placed to [phone] to submit a referral. Was told that writer would receive a call back.    Writer spoke to patient to confirm what writer was told and to update him that the [facility] was called. Writer did inform him that if they don't have an opening, his medicare would be billed for TCU and he is in agreement.     Will continue to follow    BENI Sprague

## 2020-12-21 NOTE — PROGRESS NOTES
[facility]    Medicine Progress Note - Hospitalist Service       Date of Admission:  12/19/2020  Date of Service: 12/20/2020    Assessment & Plan          Braxton Mantilla is a 75 year old male admitted on 12/19/2020. He presents with hip pain following a fall.       Closed displaced intertrochanteric fracture of left femur, initial encounter (H)    Fall, initial encounter    Knee abrasion, left, initial encounter    Assessment: Presents with left hip pain in setting of what appears to be a mechanical fall on the day of admission.  X-ray of his left pelvis shows a mild to moderate displacement of his left intertrochanteric fracture.  His CT head was negative given there was concern of some degree of head trauma.  His chest x-ray shows patchy left basilar opacities, but he is without any significant pulmonary symptoms.    Plan:   -Orthopedic surgery consultation -> plan for OR today  -pain control as needed  -Fall precautions  -Regular diet if no plans for surgery today  -Follow vitals/temp      Chronic atrial fibrillation (H)    Long term current use of anticoagulant therapy    Assessment/Plan: hold PTA Xarelto given likely need for surgical intervention.       Essential hypertension    Assessment/Plan: resume PTA BB / Lisinopril / Norvasc in AM      Hereditary and idiopathic peripheral neuropathy      Type 2 diabetes mellitus (H)    Assessment/Plan: currently diet controlled      Hypothyroidism    Assessment/Plan: continue PTA thyroid replacement           Diet: Regular Diet Adult    DVT Prophylaxis: Pneumatic Compression Devices  Spicer Catheter: not present  Code Status: Full Code           Disposition Plan   Expected discharge: 2 - 3 days, recommended to prior living arrangement once adequate pain management/ tolerating PO medications.  Entered: Beltran Rea MD 12/20/2020, 6:05 PM       The patient's care was discussed with the Bedside Nurse and Patient.    Beltran Rea MD  Hospitalist  [facility]  Contact information available via [facility] Paging/Directory    ______________________________________________________________________    Interval History     Frustrated surgery delayed as he is hungry  No CP/SOB. No fevers or chills  No nausea/vomititing   Pain controlled for the most part when not moving.     Data reviewed today: I reviewed all medications, new labs and imaging results over the last 24 hours. I personally reviewed no images or EKG's today.    Physical Exam   Vital Signs: Temp: 99.2  F (37.3  C) Temp src: Oral BP: 116/78 Pulse: 95   Resp: 16 SpO2: 97 % O2 Device: None (Room air)    Weight: 145 lbs 0 oz    Constitutional: awake, alert, cooperative, no apparent distress.   Respiratory: CTABL   Cardiovascular: RRR with no m/r/g   GI: Normal bowel sounds, soft, non-distended, non-tender.   Skin: normal skin color, texture, turgor.  There is an abrasion over his left knee.  Musculoskeletal: Patient's range of motion in his left hip is severely limited secondary to pain.  Neurologic: Awake, alert, oriented to name, place and time. Motor is 5 out of 5 bilaterally. Sensory is intact.   Neuropsychiatric: normal mood and affect    Data   Recent Labs   Lab 12/20/20  1041 12/19/20  0846   WBC 8.3 11.4*   HGB 12.8* 13.6   * 106*    234   INR  --  1.27*    138   POTASSIUM 4.3 4.1   CHLORIDE 106 106   CO2 28 25   BUN 11 9   CR 0.93 1.00   ANIONGAP 5 7   FADI 8.6 8.6   GLC 87 116*     No results found for this or any previous visit (from the past 24 hour(s)).  Medications     - MEDICATION INSTRUCTIONS -         amLODIPine  10 mg Oral Daily     levothyroxine  112 mcg Oral Daily     lisinopril  20 mg Oral Daily     metoprolol succinate ER  50 mg Oral Daily     simvastatin  80 mg Oral At Bedtime     sodium chloride (PF)  3 mL Intracatheter Q8H

## 2020-12-21 NOTE — PLAN OF CARE
A&OX4, VSS on RA. C/o pain in the left hip with movement. Bruising around left hip, left knee and arm abrasions. On strict bedrest. Pain managed with Norco and ice application. IV Sled. Plan for surgery late in the night today. Meals ordered for after surgery and in the refrigerator. NPO.Continue to monitor.

## 2020-12-21 NOTE — ANESTHESIA POSTPROCEDURE EVALUATION
Patient: Braxton Mantilla    Procedure(s):  INTERNAL FIXATION, FRACTURE, TROCHANTERIC, HIP, USING PINS OR RODS    Diagnosis:Hip fracture (H) [S72.009A]  Diagnosis Additional Information: No value filed.    Anesthesia Type:  General    Note:  Anesthesia Post Evaluation    Patient location during evaluation: PACU  Patient participation: Able to fully participate in evaluation  Level of consciousness: awake  Pain management: adequate  Airway patency: patent  Cardiovascular status: acceptable and tachycardic  Respiratory status: acceptable  Hydration status: acceptable  PONV: none     Anesthetic complications: None    Comments: Afib with RVR, treated with metoprolol. Will place patient on telemetry for additional monitoring.         Last vitals:  Vitals:    12/20/20 2230 12/20/20 2240 12/20/20 2301   BP: (!) 111/90 (!) 125/90    Pulse: 121 101    Resp: 16 14    Temp: 37.7  C (99.9  F) 37.5  C (99.5  F)    SpO2: 96% 97% 97%         Electronically Signed By: Jed Anaya MD  December 20, 2020  11:05 PM

## 2020-12-21 NOTE — PROGRESS NOTES
[facility] RN Trauma Coordinator:    Patient plans on discharging to TCU, per  note.      Plan - will continue to follow along with social workers plan.    Sara Graves RN  Trauma Coordinator     [facility]  [place]  [address] [facility] [address]   [place] [address]  T: [phone]  C. [phone]  F: [phone]  E: [email or web address]    [email or web address]

## 2020-12-21 NOTE — PROGRESS NOTES
Orthopedic Surgery  Braxton Mantilla  2020  Admit Date:  2020  POD: 1 Day Post-Op   Procedure(s):  INTERNAL FIXATION, FRACTURE, TROCHANTERIC, HIP, USING PINS OR RODS    Alert and oriented to person, place, and time.  Patient resting comfortably in bed.    Pain controlled.  Tolerated PT.   Tolerating oral intake.    Denies nausea or vomiting  Denies chest pain or shortness of breath    Vital Sign Ranges  Temperature Temp  Av.6  F (37.6  C)  Min: 98.1  F (36.7  C)  Max: 100.5  F (38.1  C)   Blood pressure Systolic (24hrs), Av , Min:94 , Max:146        Diastolic (24hrs), Av, Min:54, Max:133      Pulse Pulse  Av.1  Min: 57  Max: 156   Respirations Resp  Av.4  Min: 11  Max: 30   Pulse oximetry SpO2  Av.2 %  Min: 90 %  Max: 99 %       Dressing is clean, dry, and intact. Proximal dressing is peeling.    Minimal erythema of the surrounding skin. Left knee abrasion. No effusion.   Bilateral calves are soft, non-tender.  Left lower extremity is NVI.  Sensation intact bilateral lower extremities  Patient able to resist dorsi and plantar flexion bilaterally  +Dp pulse    Labs:  Recent Labs   Lab Test 20  1041 20  0846   POTASSIUM 4.3 4.1     Recent Labs   Lab Test 20  0703 20  1041 20  0846   HGB 12.2* 12.8* 13.6     Recent Labs   Lab Test 20  0846   INR 1.27*     Recent Labs   Lab Test 20  1041 20  0846    234       1. PLAN:   Continue Xarelto for DVT prophylaxis.     Mobilize with PT/OT    WBAT Left LE with walker.     Continue current pain regiment.   Dressings: Reinforce proximal dressing     2. Disposition   Anticipate d/c to TCU likely 2 days when medically cleared and progressing in PT.    Eliz Steen PA-C

## 2020-12-22 ENCOUNTER — APPOINTMENT (OUTPATIENT)
Dept: PHYSICAL THERAPY | Facility: CLINIC | Age: 75
DRG: 481 | End: 2020-12-22
Payer: MEDICARE

## 2020-12-22 VITALS
OXYGEN SATURATION: 99 % | RESPIRATION RATE: 16 BRPM | WEIGHT: 145 LBS | HEART RATE: 61 BPM | SYSTOLIC BLOOD PRESSURE: 119 MMHG | TEMPERATURE: 98 F | DIASTOLIC BLOOD PRESSURE: 75 MMHG

## 2020-12-22 LAB
ANION GAP SERPL CALCULATED.3IONS-SCNC: 2 MMOL/L (ref 3–14)
BUN SERPL-MCNC: 6 MG/DL (ref 7–30)
CALCIUM SERPL-MCNC: 8.5 MG/DL (ref 8.5–10.1)
CHLORIDE SERPL-SCNC: 108 MMOL/L (ref 94–109)
CO2 SERPL-SCNC: 30 MMOL/L (ref 20–32)
CREAT SERPL-MCNC: 0.68 MG/DL (ref 0.66–1.25)
ERYTHROCYTE [DISTWIDTH] IN BLOOD BY AUTOMATED COUNT: 12.8 % (ref 10–15)
GFR SERPL CREATININE-BSD FRML MDRD: >90 ML/MIN/{1.73_M2}
GLUCOSE SERPL-MCNC: 103 MG/DL (ref 70–99)
HCT VFR BLD AUTO: 35.2 % (ref 40–53)
HGB BLD-MCNC: 11.8 G/DL (ref 13.3–17.7)
MCH RBC QN AUTO: 35.8 PG (ref 26.5–33)
MCHC RBC AUTO-ENTMCNC: 33.5 G/DL (ref 31.5–36.5)
MCV RBC AUTO: 107 FL (ref 78–100)
PLATELET # BLD AUTO: 184 10E9/L (ref 150–450)
POTASSIUM SERPL-SCNC: 4.6 MMOL/L (ref 3.4–5.3)
RBC # BLD AUTO: 3.3 10E12/L (ref 4.4–5.9)
SODIUM SERPL-SCNC: 140 MMOL/L (ref 133–144)
WBC # BLD AUTO: 8 10E9/L (ref 4–11)

## 2020-12-22 PROCEDURE — 250N000013 HC RX MED GY IP 250 OP 250 PS 637: Performed by: ORTHOPAEDIC SURGERY

## 2020-12-22 PROCEDURE — 250N000013 HC RX MED GY IP 250 OP 250 PS 637: Performed by: HOSPITALIST

## 2020-12-22 PROCEDURE — 85027 COMPLETE CBC AUTOMATED: CPT | Performed by: STUDENT IN AN ORGANIZED HEALTH CARE EDUCATION/TRAINING PROGRAM

## 2020-12-22 PROCEDURE — 36415 COLL VENOUS BLD VENIPUNCTURE: CPT | Performed by: STUDENT IN AN ORGANIZED HEALTH CARE EDUCATION/TRAINING PROGRAM

## 2020-12-22 PROCEDURE — 97530 THERAPEUTIC ACTIVITIES: CPT | Mod: GP | Performed by: PHYSICAL THERAPY ASSISTANT

## 2020-12-22 PROCEDURE — 250N000013 HC RX MED GY IP 250 OP 250 PS 637: Performed by: STUDENT IN AN ORGANIZED HEALTH CARE EDUCATION/TRAINING PROGRAM

## 2020-12-22 PROCEDURE — 97116 GAIT TRAINING THERAPY: CPT | Mod: GP | Performed by: PHYSICAL THERAPY ASSISTANT

## 2020-12-22 PROCEDURE — 97110 THERAPEUTIC EXERCISES: CPT | Mod: GP | Performed by: PHYSICAL THERAPY ASSISTANT

## 2020-12-22 PROCEDURE — 80048 BASIC METABOLIC PNL TOTAL CA: CPT | Performed by: STUDENT IN AN ORGANIZED HEALTH CARE EDUCATION/TRAINING PROGRAM

## 2020-12-22 PROCEDURE — 99239 HOSP IP/OBS DSCHRG MGMT >30: CPT | Performed by: HOSPITALIST

## 2020-12-22 RX ORDER — GABAPENTIN 100 MG/1
100 CAPSULE ORAL AT BEDTIME
DISCHARGE
Start: 2020-12-22 | End: 2021-03-02

## 2020-12-22 RX ADMIN — DOCUSATE SODIUM 50 MG AND SENNOSIDES 8.6 MG 1 TABLET: 8.6; 5 TABLET, FILM COATED ORAL at 09:28

## 2020-12-22 RX ADMIN — LISINOPRIL 20 MG: 20 TABLET ORAL at 09:27

## 2020-12-22 RX ADMIN — RIVAROXABAN 20 MG: 10 TABLET, FILM COATED ORAL at 09:26

## 2020-12-22 RX ADMIN — AMLODIPINE BESYLATE 10 MG: 10 TABLET ORAL at 09:27

## 2020-12-22 RX ADMIN — ACETAMINOPHEN 975 MG: 325 TABLET, FILM COATED ORAL at 09:27

## 2020-12-22 RX ADMIN — METOPROLOL SUCCINATE 50 MG: 50 TABLET, EXTENDED RELEASE ORAL at 09:27

## 2020-12-22 RX ADMIN — OXYCODONE HYDROCHLORIDE 5 MG: 5 TABLET ORAL at 10:44

## 2020-12-22 RX ADMIN — DOCUSATE SODIUM 100 MG: 100 CAPSULE, LIQUID FILLED ORAL at 09:27

## 2020-12-22 RX ADMIN — LEVOTHYROXINE SODIUM 112 MCG: 112 TABLET ORAL at 09:27

## 2020-12-22 ASSESSMENT — ACTIVITIES OF DAILY LIVING (ADL)
ADLS_ACUITY_SCORE: 15

## 2020-12-22 NOTE — PROGRESS NOTES
Orthopedic Surgery  Braxton Mantilla  2020  Admit Date:  2020  POD: 2 Days Post-Op   Procedure(s):  INTERNAL FIXATION, FRACTURE, TROCHANTERIC, HIP, USING PINS OR RODS    Alert and oriented to person, place, and time.    Patient resting in chair.    Pain increased after having surgery.  Tolerating oral intake.    Denies nausea or vomiting  Denies chest pain or shortness of breath    Vital Sign Ranges  Temperature Temp  Av.8  F (37.1  C)  Min: 98  F (36.7  C)  Max: 99.3  F (37.4  C)   Blood pressure Systolic (24hrs), Av , Min:94 , Max:119        Diastolic (24hrs), Av, Min:54, Max:78      Pulse Pulse  Av.6  Min: 61  Max: 105   Respirations Resp  Avg: 15.8  Min: 15  Max: 16   Pulse oximetry SpO2  Av.2 %  Min: 92 %  Max: 99 %       Dressing is clean, dry, and intact. Proximal dressing is peeling.    Minimal erythema of the surrounding skin. Left knee abrasion. No effusion.   Bilateral calves are soft, non-tender.  Left lower extremity is NVI.  Sensation intact bilateral lower extremities  Patient able to resist dorsi and plantar flexion bilaterally  +Dp pulse    Labs:  Recent Labs   Lab Test 20  0902 20  1041 20  0846   POTASSIUM 4.6 4.3 4.1     Recent Labs   Lab Test 20  0902 20  0703 20  1041   HGB 11.8* 12.2* 12.8*     Recent Labs   Lab Test 20  0846   INR 1.27*     Recent Labs   Lab Test 20  0902 20  1041 20  0846    190 234     1. PLAN:              Continue Xarelto for DVT prophylaxis.                Mobilize with PT/OT               WBAT Left LE with walker.                Continue current pain regiment.              Dressings: Keep intact unless saturated/peeling      2. Disposition              Anticipate d/c to TCU likely tomorrow when medically cleared and progressing in PT.  Eliz Steen PA-C

## 2020-12-22 NOTE — PLAN OF CARE
A/Ox4. VSS on ra. CMS intact except baseline neuropathy. Dressing CDI. PRN oxy for pain. Did not get OOB this shift. Voiding adequately in urinal.

## 2020-12-22 NOTE — PLAN OF CARE
Discharge    Patient discharged to ARU via wheelchair/stretcher with transport    Listed belongings gathered and returned to patient. Yes  Care Plan and Patient education resolved: Yes  Prescriptions if needed, hard copies sent with patient  Yes  Home and hospital acquired medications returned to patient: Yes  Medication Bin checked and emptied on discharge Yes  Follow up appointment made for patient:  ARU will followup with pt.       VSS on RA. Pt is A&Ox4.

## 2020-12-22 NOTE — PROGRESS NOTES
[facility]  INITIAL VISIT NOTE  December 23, 2020    PRIMARY CARE PROVIDER AND CLINIC:  [facility]    Chief Complaint   Patient presents with     Hospital F/U       HPI:    Braxton Mantilla is a 75 year old  (1945) male who was seen at [place] [facility] on December 23, 2020 for an initial visit. Medical history is notable for HTN, atrial fibrillation and DM II. He was hospitalized at [facility] from 12/19/20 to 12/22/20 where he presented after a fall and was found to have a left intertrochanteric femur fracture. He is s/p cephalomedullary nail (12/20/20). Surgery without complication. Developed acute blood loss anemia (Hgb 13.6 --> 11.9) He is new on gabapentin. He was admitted to this facility for medical management and rehab.     Today, Mr. Mantilla is seen in his room. His main concern today is constipation. Last BM was on Saturday (today is Wednesday). Pain in left hip is controlled. The bedtimd gabapentin helps him sleep and isn't causing and dizziness or apparent side effects. No abdominal pain. He is passing gas. No chest pain or dyspnea. Reviewed hospital course and expected TCU course. He has multiple steps at home which will be his main barrier to discharge. No concerns per discussion with nursing. Therapy to see him yet today.     CODE STATUS:   CPR/Full code     ALLERGIES:   No Known Allergies    PAST MEDICAL HISTORY:   Past Medical History:   Diagnosis Date     Benign essential hypertension      Hypothyroidism      Type 2 diabetes mellitus (H)        PAST SURGICAL HISTORY:   No past surgical history on file.    FAMILY HISTORY:   Family History   Problem Relation Age of Onset     No Known Problems Mother      No Known Problems Father        SOCIAL HISTORY:   Lives alone    MEDICATIONS:  Post Discharge Medication Reconciliation Status: discharge medications reconciled and changed, per note/orders.   Current Outpatient Medications   Medication Sig Dispense Refill      "acetaminophen (TYLENOL) 325 MG tablet Take 2 tablets (650 mg) by mouth every 4 hours as needed for other (For optimal non-opioid multimodal pain management to improve pain control.) 30 tablet 0     amLODIPine (NORVASC) 10 MG tablet Take 10 mg by mouth daily       gabapentin (NEURONTIN) 100 MG capsule Take 1 capsule (100 mg) by mouth At Bedtime       levothyroxine (SYNTHROID/LEVOTHROID) 112 MCG tablet Take 112 mcg by mouth daily       lisinopril (ZESTRIL) 40 MG tablet Take 20 mg by mouth daily       methocarbamol (ROBAXIN) 500 MG tablet Take 500 mg by mouth 2 times daily as needed for muscle spasms       metoprolol succinate ER (TOPROL-XL) 100 MG 24 hr tablet Take 50 mg by mouth daily       ondansetron (ZOFRAN-ODT) 4 MG ODT tab Take 1 tablet (4 mg) by mouth every 6 hours as needed for nausea or vomiting 8 tablet 0     oxyCODONE (ROXICODONE) 5 MG tablet Take 1-2 tablets (5-10 mg) by mouth every 4 hours as needed for moderate to severe pain 1 tab po q 4 hrs prn pain scale \"1-5\"  2 tabs po q 4 hrs prn pain scale \"6-10\" 30 tablet 0     rivaroxaban ANTICOAGULANT (XARELTO) 20 MG TABS tablet Take 20 mg by mouth daily (with dinner)       senna-docusate (SENOKOT-S/PERICOLACE) 8.6-50 MG tablet Take 1 tablet by mouth 2 times daily And 1 tab BID PRN       sildenafil (VIAGRA) 100 MG tablet Take 100 mg by mouth daily as needed On hold at [facility]       simvastatin (ZOCOR) 80 MG tablet Take by mouth At Bedtime         ROS:  10 point ROS neg other than the symptoms noted above in the HPI.    PHYSICAL EXAM:  /68   Pulse 94   Temp 97.7  F (36.5  C)   Resp 20   Wt 65.8 kg (145 lb)   SpO2 97%    Gen: sitting up in bed, alert, cooperative and in no acute distress  HEENT: normocephalic; oropharynx clear  Card: RRR, S1, S2, no murmurs  Resp: lungs clear to auscultation bilaterally, no crackles or wheezes  GI: abdomen soft, not-tender, non-distended, good bowel sounds  MSK: normal muscle tone, no LE edema; dressing c/d/i over L hip " incision  Neuro: CX II-XII grossly in tact; ROM in all four extremities grossly in tact  Psych: alert and oriented x3; normal affect    LABORATORY/IMAGING DATA:  Reviewed as per Epic    ASSESSMENT/PLAN:    Left Intertrochanteric Femur Fracture s/p IM Nail (12/20/20)  Secondary to a fall the day prior. Surgery without complication.   -- WBAT with walker  -- analgesia with APAP 650 mg q4h PRN, gabapentin 100 mg at bedtime (new), methocarbamol 500 mg BID PRN, oxycodone 5-10 mg q4h PRN  -- DVT ppx with rivaroxaban (chronic med)  -- PT/OT  -- follow up with ortho as scheduled    Acute Blood Loss Anemia  Secondary to above. No evidence of ongoing bleeding. Hgb 13.6 --> 11.9  -- Hgb 12/29/20 to ensure stability    HTN  No SBP trend as new to TCU  -- amlodipine 10 mg daily, lisinopril 20 mg daily, metoprolol XL 50 mg daily   -- follow BPs and adjust medications as needed    Chronic Atrial Fibrillation  No HR trend as new to TCU  -- metoprolol XL 50 mg daily   -- rivaroxaban 20 mg daily     DM, Type II  Hgb A1c 5.2 in June at the [facility]. Diet controlled.   -- no need to check blood glucose regualrly    HLD  -- simvastatin 80 mg at bedtime    Hypothyroidisim  TSH not known  -- levothyroxine 112 mcg daily     Slow Transit Constipation  Reports no BM in 4 days. Belly is soft with good bowel sounds. He's passing gas  -- discontinue lactulose 10 g BID PRN   -- increase Senna-S to 1 tab BID and 1 tab BID PRN  -- adjust bowel regimen as needed    Fall  Physical Deconditioning  In setting of hospitalization and underlying medical conditions  -- ongoing PT/OT      Electronically signed by:  Vael Osuna MD

## 2020-12-22 NOTE — PROGRESS NOTES
Care Management Discharge Note    Discharge Date: 12/22/20  Expected Time of Departure: wheelchair via skyway 1400    Discharge Disposition: Transitional Care    Discharge Services: None    Discharge DME: None    Discharge Transportation: health plan transportation    Private pay costs discussed: insurance costs out of pocket expenses    PAS Confirmation Code: [id number]  Patient/family educated on Medicare website which has current facility and service quality ratings: yes    Education Provided on the Discharge Plan:    Persons Notified of Discharge Plans: yes  Patient/Family in Agreement with the Plan: yes    Handoff Referral Completed: Yes    Additional Information:  [facility] not [facility]. Writer spoke to patient and he agreed for a referral to be sent to [place]. He has concerns of coverage and writer let him know that would be screened as well. Referral sent to [facility] via [facility].    Update: patient accepted at [facility] today for discharge. Orders received for discharge today. Ride scheduled for today via wheelchair at 1400. Writer updated patient and he is in agreement. [facility] informed writer that patient's medicare covers day 1-20 and on day 21 has a copay of $185.50. Writer discussed this with patient and he is in agreement. Orders faxed to [facility] and bedside nurse updated.    PAS-RR    D: Per DHS regulation, BELKYS completed and submitted PAS-RR to [place] [employer] Direct Connect via the Senior LinkAge Line.  PAS-RR confirmation # is : [id number]    I: BELKYS spoke with patient and they are aware a PAS-RR has been submitted.  BELKYS reviewed with patient that they may be contacted for a follow up appointment within 10 days of hospital discharge if their SNF stay is < 30 days.  Contact information for [facility] LinkAge Line was also provided.    A: patient verbalized understanding.    P: Further questions may be directed to [facility] LinkAge Line at #[phone], option #4 for PAS-RR  staff.          BENI Sprague

## 2020-12-22 NOTE — PROGRESS NOTES
[facility]    Medicine Progress Note - Hospitalist Service       Date of Admission:  12/19/2020  Date of Service: 12/21/2020    Assessment & Plan          Braxton Mantilla is a 75 year old male admitted on 12/19/2020. He presents with hip pain following a fall.       Closed displaced intertrochanteric fracture of left femur, initial encounter (H)    Fall, initial encounter    Knee abrasion, left, initial encounter    Assessment: Presents with left hip pain in setting of what appears to be a mechanical fall on the day of admission.  X-ray of his left pelvis shows a mild to moderate displacement of his left intertrochanteric fracture.  His CT head was negative given there was concern of some degree of head trauma.  His chest x-ray shows patchy left basilar opacities, but he is without any significant pulmonary symptoms.    Plan:   -Orthopedic surgery consultation -> s/p INTERNAL FIXATION, FRACTURE, TROCHANTERIC, HIP, USING PINS OR RODS  -pain control as needed  -Fall precautions  -Regular diet  -PT/OT/SW  -Follow vitals/temp      Chronic atrial fibrillation (H)    Long term current use of anticoagulant therapy    Assessment/Plan: hold PTA Xarelto until okay with Orthopedics to resume      Essential hypertension    Assessment/Plan: resume PTA BB / Lisinopril / Norvasc      Hereditary and idiopathic peripheral neuropathy      Type 2 diabetes mellitus (H)    Assessment/Plan: currently diet controlled      Hypothyroidism    Assessment/Plan: continue PTA thyroid replacement       Diet: Advance Diet as Tolerated: Regular Diet Adult    DVT Prophylaxis: Pneumatic Compression Devices  Spicer Catheter: not present  Code Status: Full Code           Disposition Plan   Expected discharge: 2 - 3 days, recommended to prior living arrangement once adequate pain management/ tolerating PO medications.  Entered: Beltran Rea MD 12/21/2020, 6:34 PM       The patient's care was discussed with the Bedside Nurse and  Patient.    Beltran Rea MD  Hospitalist Service  [facility]  Contact information available via [facility] Paging/Directory    ______________________________________________________________________    Interval History     No acute events overnight  Doing well post op  Post op pain controlled  No CP/SOB. No fevers or chills  No nausea/vomititing   Pain controlled for the most part when not moving.     Data reviewed today: I reviewed all medications, new labs and imaging results over the last 24 hours. I personally reviewed no images or EKG's today.    Physical Exam   Vital Signs: Temp: 98.7  F (37.1  C) Temp src: Oral BP: 107/58 Pulse: 86   Resp: 16 SpO2: 99 % O2 Device: None (Room air) Oxygen Delivery: 2 LPM  Weight: 145 lbs 0 oz    Constitutional: awake, alert, cooperative, no apparent distress.   Respiratory: CTABL   Cardiovascular: RRR with no m/r/g   GI: Normal bowel sounds, soft, non-distended, non-tender.   Skin: normal skin color, texture, turgor.  There is an abrasion over his left knee.  Musculoskeletal: Patient's range of motion in his left hip is severely limited secondary to pain.  Neurologic: Awake, alert, oriented to name, place and time. Motor is 5 out of 5 bilaterally. Sensory is intact.   Neuropsychiatric: normal mood and affect    Data   Recent Labs   Lab 12/21/20  0703 12/20/20  1041 12/19/20  0846   WBC  --  8.3 11.4*   HGB 12.2* 12.8* 13.6   MCV  --  109* 106*   PLT  --  190 234   INR  --   --  1.27*   NA  --  139 138   POTASSIUM  --  4.3 4.1   CHLORIDE  --  106 106   CO2  --  28 25   BUN  --  11 9   CR  --  0.93 1.00   ANIONGAP  --  5 7   FADI  --  8.6 8.6   GLC 96 87 116*     Recent Results (from the past 24 hour(s))   XR Surgery OLINDA L/T 5 Min Fluoro w Stills    Narrative    EXAM: XR SURGERY C-ARM FLUORO LESS THAN 5 MIN W ZINA  LOCATION: [facility]  DATE/TIME: 12/20/2020 8:00 PM    INDICATION: Intraoperative views.  COMPARISON: X-ray from 12/19/2020.       Impression    IMPRESSION: 5 images of the left hip demonstrate a cephalomedullary screw fixing an intertrochanteric fracture in good position. No evidence of complication.           Medications     lactated ringers 100 mL/hr at 12/21/20 0911     - MEDICATION INSTRUCTIONS -         acetaminophen  975 mg Oral Q8H     amLODIPine  10 mg Oral Daily     docusate sodium  100 mg Oral BID     gabapentin  100 mg Oral At Bedtime     levothyroxine  112 mcg Oral Daily     lisinopril  20 mg Oral Daily     metoprolol succinate ER  50 mg Oral Daily     polyethylene glycol  17 g Oral Daily     rivaroxaban ANTICOAGULANT  10 mg Oral Daily     senna-docusate  1 tablet Oral BID     simvastatin  80 mg Oral At Bedtime     sodium chloride (PF)  3 mL Intracatheter Q8H

## 2020-12-22 NOTE — PLAN OF CARE
Pt rates pain #4 when lying in bed and #8 when moving.  Pt refuses cares, eating, getting up.  Pt states he is crabby because he hasn't slept.  Proximal dressing reinforced.  Denies nausea.  Voids in 300cc amounts x 2 this 4 hr shift.  IV switched to SL. Has baseline neuropathy.  Skin issues are (L) knee scab, much ecchymosis to (L) flank.   Tele showed Afib-CVR.

## 2020-12-23 ENCOUNTER — NURSING HOME VISIT (OUTPATIENT)
Dept: GERIATRICS | Facility: CLINIC | Age: 75
End: 2020-12-23
Payer: COMMERCIAL

## 2020-12-23 VITALS
SYSTOLIC BLOOD PRESSURE: 117 MMHG | HEART RATE: 94 BPM | WEIGHT: 145 LBS | RESPIRATION RATE: 20 BRPM | TEMPERATURE: 97.7 F | OXYGEN SATURATION: 97 % | DIASTOLIC BLOOD PRESSURE: 68 MMHG

## 2020-12-23 DIAGNOSIS — E03.9 HYPOTHYROIDISM, UNSPECIFIED TYPE: ICD-10-CM

## 2020-12-23 DIAGNOSIS — E11.9 TYPE 2 DIABETES MELLITUS WITHOUT COMPLICATION, WITHOUT LONG-TERM CURRENT USE OF INSULIN (H): ICD-10-CM

## 2020-12-23 DIAGNOSIS — R53.81 PHYSICAL DECONDITIONING: ICD-10-CM

## 2020-12-23 DIAGNOSIS — I48.20 CHRONIC ATRIAL FIBRILLATION (H): ICD-10-CM

## 2020-12-23 DIAGNOSIS — S72.142A CLOSED DISPLACED INTERTROCHANTERIC FRACTURE OF LEFT FEMUR, INITIAL ENCOUNTER (H): ICD-10-CM

## 2020-12-23 DIAGNOSIS — Z47.89 AFTERCARE FOLLOWING SURGERY OF THE MUSCULOSKELETAL SYSTEM: Primary | ICD-10-CM

## 2020-12-23 DIAGNOSIS — D62 ANEMIA DUE TO BLOOD LOSS, ACUTE: ICD-10-CM
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
## 2020-12-23 DIAGNOSIS — E78.5 HYPERLIPIDEMIA, UNSPECIFIED HYPERLIPIDEMIA TYPE: ICD-10-CM

## 2020-12-23 DIAGNOSIS — S72.145D CLOSED NONDISPLACED INTERTROCHANTERIC FRACTURE OF LEFT FEMUR WITH ROUTINE HEALING, SUBSEQUENT ENCOUNTER: ICD-10-CM

## 2020-12-23 DIAGNOSIS — K59.01 SLOW TRANSIT CONSTIPATION: ICD-10-CM

## 2020-12-23 PROCEDURE — 99305 1ST NF CARE MODERATE MDM 35: CPT | Performed by: INTERNAL MEDICINE

## 2020-12-23 RX ORDER — OXYCODONE HYDROCHLORIDE 5 MG/1
5-10 TABLET ORAL EVERY 4 HOURS PRN
Qty: 30 TABLET | Refills: 0 | Status: SHIPPED | OUTPATIENT
Start: 2020-12-23 | End: 2020-12-28

## 2020-12-23 NOTE — LETTER
12/23/2020        RE: Braxton Mantilla  [address]  [place] [address]        [facility]  INITIAL VISIT NOTE  December 23, 2020    PRIMARY CARE PROVIDER AND CLINIC:  [facility]    Chief Complaint   Patient presents with     Hospital F/U       HPI:    Braxton Mantilla is a 75 year old  (1945) male who was seen at [place] [facility] T[facility] on December 23, 2020 for an initial visit. Medical history is notable for HTN, atrial fibrillation and DM II. He was hospitalized at [facility] from 12/19/20 to 12/22/20 where he presented after a fall and was found to have a left intertrochanteric femur fracture. He is s/p cephalomedullary nail (12/20/20). Surgery without complication. Developed acute blood loss anemia (Hgb 13.6 --> 11.9) He is new on gabapentin. He was admitted to this facility for medical management and rehab.     Today, Mr. Mantilla is seen in his room. His main concern today is constipation. Last BM was on Saturday (today is Wednesday). Pain in left hip is controlled. The bedtimd gabapentin helps him sleep and isn't causing and dizziness or apparent side effects. No abdominal pain. He is passing gas. No chest pain or dyspnea. Reviewed hospital course and expected TCU course. He has multiple steps at home which will be his main barrier to discharge. No concerns per discussion with nursing. Therapy to see him yet today.     CODE STATUS:   CPR/Full code     ALLERGIES:   No Known Allergies    PAST MEDICAL HISTORY:   Past Medical History:   Diagnosis Date     Benign essential hypertension      Hypothyroidism      Type 2 diabetes mellitus (H)        PAST SURGICAL HISTORY:   No past surgical history on file.    FAMILY HISTORY:   Family History   Problem Relation Age of Onset     No Known Problems Mother      No Known Problems Father        SOCIAL HISTORY:   Lives alone    MEDICATIONS:  Post Discharge Medication Reconciliation Status: discharge medications reconciled and changed,  "per note/orders.   Current Outpatient Medications   Medication Sig Dispense Refill     acetaminophen (TYLENOL) 325 MG tablet Take 2 tablets (650 mg) by mouth every 4 hours as needed for other (For optimal non-opioid multimodal pain management to improve pain control.) 30 tablet 0     amLODIPine (NORVASC) 10 MG tablet Take 10 mg by mouth daily       gabapentin (NEURONTIN) 100 MG capsule Take 1 capsule (100 mg) by mouth At Bedtime       levothyroxine (SYNTHROID/LEVOTHROID) 112 MCG tablet Take 112 mcg by mouth daily       lisinopril (ZESTRIL) 40 MG tablet Take 20 mg by mouth daily       methocarbamol (ROBAXIN) 500 MG tablet Take 500 mg by mouth 2 times daily as needed for muscle spasms       metoprolol succinate ER (TOPROL-XL) 100 MG 24 hr tablet Take 50 mg by mouth daily       ondansetron (ZOFRAN-ODT) 4 MG ODT tab Take 1 tablet (4 mg) by mouth every 6 hours as needed for nausea or vomiting 8 tablet 0     oxyCODONE (ROXICODONE) 5 MG tablet Take 1-2 tablets (5-10 mg) by mouth every 4 hours as needed for moderate to severe pain 1 tab po q 4 hrs prn pain scale \"1-5\"  2 tabs po q 4 hrs prn pain scale \"6-10\" 30 tablet 0     rivaroxaban ANTICOAGULANT (XARELTO) 20 MG TABS tablet Take 20 mg by mouth daily (with dinner)       senna-docusate (SENOKOT-S/PERICOLACE) 8.6-50 MG tablet Take 1 tablet by mouth 2 times daily And 1 tab BID PRN       sildenafil (VIAGRA) 100 MG tablet Take 100 mg by mouth daily as needed On hold at [facility]       simvastatin (ZOCOR) 80 MG tablet Take by mouth At Bedtime         ROS:  10 point ROS neg other than the symptoms noted above in the HPI.    PHYSICAL EXAM:  /68   Pulse 94   Temp 97.7  F (36.5  C)   Resp 20   Wt 65.8 kg (145 lb)   SpO2 97%    Gen: sitting up in bed, alert, cooperative and in no acute distress  HEENT: normocephalic; oropharynx clear  Card: RRR, S1, S2, no murmurs  Resp: lungs clear to auscultation bilaterally, no crackles or wheezes  GI: abdomen soft, not-tender, " non-distended, good bowel sounds  MSK: normal muscle tone, no LE edema; dressing c/d/i over L hip incision  Neuro: CX II-XII grossly in tact; ROM in all four extremities grossly in tact  Psych: alert and oriented x3; normal affect    LABORATORY/IMAGING DATA:  Reviewed as per Epic    ASSESSMENT/PLAN:    Left Intertrochanteric Femur Fracture s/p IM Nail (12/20/20)  Secondary to a fall the day prior. Surgery without complication.   -- WBAT with walker  -- analgesia with APAP 650 mg q4h PRN, gabapentin 100 mg at bedtime (new), methocarbamol 500 mg BID PRN, oxycodone 5-10 mg q4h PRN  -- DVT ppx with rivaroxaban (chronic med)  -- PT/OT  -- follow up with ortho as scheduled    Acute Blood Loss Anemia  Secondary to above. No evidence of ongoing bleeding. Hgb 13.6 --> 11.9  -- Hgb 12/29/20 to ensure stability    HTN  No SBP trend as new to TCU  -- amlodipine 10 mg daily, lisinopril 20 mg daily, metoprolol XL 50 mg daily   -- follow BPs and adjust medications as needed    Chronic Atrial Fibrillation  No HR trend as new to TCU  -- metoprolol XL 50 mg daily   -- rivaroxaban 20 mg daily     DM, Type II  Hgb A1c 5.2 in June at the [facility]. Diet controlled.   -- no need to check blood glucose regualrly    HLD  -- simvastatin 80 mg at bedtime    Hypothyroidisim  TSH not known  -- levothyroxine 112 mcg daily     Slow Transit Constipation  Reports no BM in 4 days. Belly is soft with good bowel sounds. He's passing gas  -- discontinue lactulose 10 g BID PRN   -- increase Senna-S to 1 tab BID and 1 tab BID PRN  -- adjust bowel regimen as needed    Fall  Physical Deconditioning  In setting of hospitalization and underlying medical conditions  -- ongoing PT/OT      Electronically signed by:  Vale Osuna MD                          Sincerely,        Vale Osuna MD

## 2020-12-28 DIAGNOSIS — S72.142A CLOSED DISPLACED INTERTROCHANTERIC FRACTURE OF LEFT FEMUR, INITIAL ENCOUNTER (H): ICD-10-CM

## 2020-12-28 RX ORDER — OXYCODONE HYDROCHLORIDE 5 MG/1
5-10 TABLET ORAL EVERY 4 HOURS PRN
Qty: 30 TABLET | Refills: 0 | Status: SHIPPED | OUTPATIENT
Start: 2020-12-28 | End: 2020-12-29

## 2020-12-29 ENCOUNTER — NURSING HOME VISIT (OUTPATIENT)
Dept: GERIATRICS | Facility: CLINIC | Age: 75
End: 2020-12-29

## 2020-12-29 ENCOUNTER — HOSPITAL LABORATORY (OUTPATIENT)
Dept: OTHER | Facility: CLINIC | Age: 75
End: 2020-12-29

## 2020-12-29 VITALS
WEIGHT: 189.7 LBS | DIASTOLIC BLOOD PRESSURE: 72 MMHG | RESPIRATION RATE: 18 BRPM | SYSTOLIC BLOOD PRESSURE: 118 MMHG | TEMPERATURE: 98.5 F | OXYGEN SATURATION: 98 % | HEART RATE: 80 BPM

## 2020-12-29 DIAGNOSIS — E11.9 TYPE 2 DIABETES MELLITUS WITHOUT COMPLICATION, WITHOUT LONG-TERM CURRENT USE OF INSULIN (H): ICD-10-CM

## 2020-12-29 DIAGNOSIS — D62 ANEMIA DUE TO BLOOD LOSS, ACUTE: ICD-10-CM

## 2020-12-29 DIAGNOSIS — I10 ESSENTIAL HYPERTENSION: ICD-10-CM

## 2020-12-29 DIAGNOSIS — S72.142A CLOSED DISPLACED INTERTROCHANTERIC FRACTURE OF LEFT FEMUR, INITIAL ENCOUNTER (H): ICD-10-CM

## 2020-12-29 DIAGNOSIS — R53.81 PHYSICAL DECONDITIONING: ICD-10-CM

## 2020-12-29 DIAGNOSIS — E03.9 HYPOTHYROIDISM, UNSPECIFIED TYPE: ICD-10-CM
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
## 2020-12-29 DIAGNOSIS — K59.01 SLOW TRANSIT CONSTIPATION: ICD-10-CM

## 2020-12-29 DIAGNOSIS — S72.142A CLOSED DISPLACED INTERTROCHANTERIC FRACTURE OF LEFT FEMUR, INITIAL ENCOUNTER (H): Primary | ICD-10-CM

## 2020-12-29 DIAGNOSIS — I48.20 CHRONIC ATRIAL FIBRILLATION (H): ICD-10-CM

## 2020-12-29 DIAGNOSIS — E78.5 HYPERLIPIDEMIA, UNSPECIFIED HYPERLIPIDEMIA TYPE: ICD-10-CM

## 2020-12-29 LAB — HGB BLD-MCNC: 13.1 G/DL (ref 13.3–17.7)

## 2020-12-29 PROCEDURE — 99309 SBSQ NF CARE MODERATE MDM 30: CPT | Performed by: NURSE PRACTITIONER

## 2020-12-29 RX ORDER — OXYCODONE HYDROCHLORIDE 5 MG/1
5-10 TABLET ORAL EVERY 4 HOURS PRN
Qty: 30 TABLET | Refills: 0 | Status: SHIPPED | OUTPATIENT
Start: 2020-12-29 | End: 2021-01-25

## 2020-12-29 RX ORDER — POLYETHYLENE GLYCOL 3350 17 G/17G
1 POWDER, FOR SOLUTION ORAL 2 TIMES DAILY
COMMUNITY
End: 2021-02-17

## 2020-12-29 NOTE — PROGRESS NOTES
[place] [facility]  [place] Medical Record Number:  [id number]  Place of Service where encounter took place:  [facility] [[id number]]  Chief Complaint   Patient presents with     RECHECK       HPI:    Braxton Mantilla  is a 75 year old (1945), who is being seen today for an episodic care visit.  HPI information obtained from: facility chart records, facility staff, patient report and [facility] chart review.     Per recent TCU provider progress notes:  75 year old male with hx of HTN, atrial fibrillation and DM II. He was hospitalized after a fall and was found to have a left intertrochanteric femur fracture. He is s/p cephalomedullary nail (12/20/20). Surgery without complication. Developed acute blood loss anemia (Hgb 13.6 --> 11.9) He is new on gabapentin. Increased senna due to constipation.     Today's concern is:  Patient seen for episodic TCU visit. He is tired after therapies. Denies headaches, chest pain, dyspnea, bladder issues. Complains of dizziness when up at times and also reports constipation despite senna. Per EMR review note BP range /58-74 and HR . Sats 98% on room air. Per therapies up with assist.     Past Medical and Surgical History reviewed in Epic today.    MEDICATIONS:  Current Outpatient Medications   Medication Sig Dispense Refill     acetaminophen (TYLENOL) 325 MG tablet Take 2 tablets (650 mg) by mouth every 4 hours as needed for other (For optimal non-opioid multimodal pain management to improve pain control.) 30 tablet 0     amLODIPine (NORVASC) 10 MG tablet Take 2.5 mg by mouth daily        gabapentin (NEURONTIN) 100 MG capsule Take 1 capsule (100 mg) by mouth At Bedtime       levothyroxine (SYNTHROID/LEVOTHROID) 112 MCG tablet Take 112 mcg by mouth daily       lisinopril (ZESTRIL) 40 MG tablet Take 20 mg by mouth daily       methocarbamol (ROBAXIN) 500 MG tablet Take 500 mg by mouth 2 times daily as needed for muscle spasms        "metoprolol succinate ER (TOPROL-XL) 100 MG 24 hr tablet Take 50 mg by mouth daily       ondansetron (ZOFRAN-ODT) 4 MG ODT tab Take 1 tablet (4 mg) by mouth every 6 hours as needed for nausea or vomiting 8 tablet 0     oxyCODONE (ROXICODONE) 5 MG tablet Take 1-2 tablets (5-10 mg) by mouth every 4 hours as needed for moderate to severe pain 1 tab po q 4 hrs prn pain scale \"1-5\"  2 tabs po q 4 hrs prn pain scale \"6-10\" 30 tablet 0     polyethylene glycol (MIRALAX) 17 g packet Take 1 packet by mouth daily       rivaroxaban ANTICOAGULANT (XARELTO) 20 MG TABS tablet Take 20 mg by mouth daily (with dinner)       sildenafil (VIAGRA) 100 MG tablet Take 100 mg by mouth daily as needed On hold at TCU       simvastatin (ZOCOR) 80 MG tablet Take by mouth At Bedtime         REVIEW OF SYSTEMS:  4 point ROS including Respiratory, CV, GI and , other than that noted in the HPI,  is negative    Objective:  /72   Pulse 80   Temp 98.5  F (36.9  C)   Resp 18   Wt 86 kg (189 lb 11.2 oz)   SpO2 98%   Exam:  Exam is limited due to COVID-19 precautions  GENERAL APPEARANCE:  Alert, in no distress, cooperative  ENT:  Mouth normal, moist mucous membranes, normal hearing acuity  EYES:  Conjunctiva and lids normal  RESP:  no respiratory distress  CV: no LE edema  NEURO:   No facial asymmetry, speech clear  PSYCH:  oriented X 3, affect and mood normal     Labs:   Most Recent 3 CBC's:  Recent Labs   Lab Test 12/29/20  0920 12/22/20  0902 12/21/20  0703 12/20/20  1041 12/19/20  0846   WBC  --  8.0  --  8.3 11.4*   HGB 13.1* 11.8* 12.2* 12.8* 13.6   MCV  --  107*  --  109* 106*   PLT  --  184  --  190 234     Most Recent 3 BMP's:  Recent Labs   Lab Test 12/22/20  0902 12/21/20  0703 12/20/20  1041 12/19/20  0846     --  139 138   POTASSIUM 4.6  --  4.3 4.1   CHLORIDE 108  --  106 106   CO2 30  --  28 25   BUN 6*  --  11 9   CR 0.68  --  0.93 1.00   ANIONGAP 2*  --  5 7   FADI 8.5  --  8.6 8.6   * 96 87 116* "       ASSESSMENT/PLAN:  Left Intertrochanteric Femur Fracture s/p IM Nail (12/20/20)  Acute onset. Pain managed adequately with APAP 650 mg q4h PRN, gabapentin 100 mg at bedtime (new), methocarbamol 500 mg BID PRN, oxycodone 5-10 mg q4h PRN and on  DVT ppx with rivaroxaban (chronic med). Monitor, ortho f/u as recommended.      Acute Blood Loss Anemia  Acute. Hgb improved. Monitor PRN.   Lab Results   Component Value Date    HGB 13.1 12/29/2020    HGB 11.8 12/22/2020       HTN  Chronic, BPs low at times. Currently on amlodipine 10 mg daily, lisinopril 20 mg daily, metoprolol XL 50 mg daily. Decrease amlodipine dose and review VS next visit.      Chronic Atrial Fibrillation  Chronic, stable. Continue  metoprolol XL 50 mg daily and rivaroxaban 20 mg daily. F/U next visit.      DM, Type II  Chronic and diet controlled. No routine BG checks.      HLD  Chronic, continue PTA simvastatin 80 mg at bedtime.      Hypothyroidism  Chronic, continue synthroid as PTA and TSH monitoring per PCP.      Slow Transit Constipation  Continues to complaint of constipation but also decreased PO intake. Stop senna and start Miralax. Staff to update provider if not effective.      Fall  Physical Deconditioning  Acute improving. Therapies - f/u with progress next visit.     Orders written by provider at facility:  1. Decrease Norvasc to 2.5 mg PO daily diagnosis HTN  2. Stop senna s. Start Miralax 17 gm PO daily diagnosis constipation    Electronically signed by:  CECILIA Brand CNP

## 2020-12-30 LAB
SARS-COV-2 RNA SPEC QL NAA+PROBE: NOT DETECTED
SPECIMEN SOURCE: NORMAL

## 2021-01-05 ENCOUNTER — NURSING HOME VISIT (OUTPATIENT)
Dept: GERIATRICS | Facility: CLINIC | Age: 76
End: 2021-01-05
Payer: COMMERCIAL

## 2021-01-05 ENCOUNTER — HOSPITAL LABORATORY (OUTPATIENT)
Dept: OTHER | Facility: CLINIC | Age: 76
End: 2021-01-05

## 2021-01-05 VITALS
HEART RATE: 87 BPM | SYSTOLIC BLOOD PRESSURE: 95 MMHG | DIASTOLIC BLOOD PRESSURE: 62 MMHG | TEMPERATURE: 98.4 F | RESPIRATION RATE: 18 BRPM | OXYGEN SATURATION: 97 % | WEIGHT: 189.8 LBS
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
## 2021-01-05 DIAGNOSIS — D62 ANEMIA DUE TO BLOOD LOSS, ACUTE: ICD-10-CM

## 2021-01-05 DIAGNOSIS — E11.9 TYPE 2 DIABETES MELLITUS WITHOUT COMPLICATION, WITHOUT LONG-TERM CURRENT USE OF INSULIN (H): ICD-10-CM

## 2021-01-05 DIAGNOSIS — K59.01 SLOW TRANSIT CONSTIPATION: ICD-10-CM

## 2021-01-05 DIAGNOSIS — Z71.89 ADVANCED DIRECTIVES, COUNSELING/DISCUSSION: ICD-10-CM

## 2021-01-05 DIAGNOSIS — I10 ESSENTIAL HYPERTENSION: ICD-10-CM

## 2021-01-05 DIAGNOSIS — S72.142A CLOSED DISPLACED INTERTROCHANTERIC FRACTURE OF LEFT FEMUR, INITIAL ENCOUNTER (H): Primary | ICD-10-CM

## 2021-01-05 DIAGNOSIS — E78.5 HYPERLIPIDEMIA, UNSPECIFIED HYPERLIPIDEMIA TYPE: ICD-10-CM

## 2021-01-05 DIAGNOSIS — E03.9 HYPOTHYROIDISM, UNSPECIFIED TYPE: ICD-10-CM

## 2021-01-05 DIAGNOSIS — R53.81 PHYSICAL DECONDITIONING: ICD-10-CM

## 2021-01-05 DIAGNOSIS — I48.20 CHRONIC ATRIAL FIBRILLATION (H): ICD-10-CM

## 2021-01-05 PROCEDURE — 99310 SBSQ NF CARE HIGH MDM 45: CPT | Performed by: NURSE PRACTITIONER

## 2021-01-05 RX ORDER — ACETAMINOPHEN 500 MG
1000 TABLET ORAL 3 TIMES DAILY PRN
COMMUNITY

## 2021-01-05 NOTE — PROGRESS NOTES
[place] [facility]  [place] Medical Record Number:  [id number]  Place of Service where encounter took place:  [facility] [[id number]]  Chief Complaint   Patient presents with     RECHECK       HPI:    Braxton Mantilla  is a 75 year old (1945), who is being seen today for an episodic care visit.  HPI information obtained from: facility chart records, facility staff, patient report and [facility] chart review.     Per recent TCU provider progress notes:  75 year old male with hx of HTN, atrial fibrillation and DM II. He was hospitalized after a fall and was found to have a left intertrochanteric femur fracture. He is s/p cephalomedullary nail (12/20/20). Surgery without complication. Developed acute blood loss anemia (Hgb 13.6 --> 11.9) He is new on gabapentin. Increased senna due to constipation.     Today's concern is:  Patient seen for episodic TCU visit. He is tired after therapies. Denies headaches, chest pain, dyspnea, bladder issues. Complains of dizziness when up at times and also reports constipation despite Miralax daily. Per EMR review note BP range /61-82 and HR 60-90s. Sats 97% on room air. Per therapies walks 5 ft with 2WW and min A. Min-mod A with transfers. SLUMS 27/30. Reviewed POLST: patient requests DNR/DNI status.    Past Medical and Surgical History reviewed in Epic today.    MEDICATIONS:  Current Outpatient Medications   Medication Sig Dispense Refill     acetaminophen (TYLENOL) 500 MG tablet Take 1,000 mg by mouth 3 times daily       gabapentin (NEURONTIN) 100 MG capsule Take 1 capsule (100 mg) by mouth At Bedtime       levothyroxine (SYNTHROID/LEVOTHROID) 112 MCG tablet Take 112 mcg by mouth daily       lisinopril (ZESTRIL) 40 MG tablet Take 10 mg by mouth daily        metoprolol succinate ER (TOPROL-XL) 100 MG 24 hr tablet Take 50 mg by mouth daily       oxyCODONE (ROXICODONE) 5 MG tablet Take 1-2 tablets (5-10 mg) by mouth every 4 hours as needed  "for moderate to severe pain 1 tab po q 4 hrs prn pain scale \"1-5\"  2 tabs po q 4 hrs prn pain scale \"6-10\" 30 tablet 0     polyethylene glycol (MIRALAX) 17 g packet Take 1 packet by mouth 2 times daily        rivaroxaban ANTICOAGULANT (XARELTO) 20 MG TABS tablet Take 20 mg by mouth daily (with dinner)       simvastatin (ZOCOR) 80 MG tablet Take by mouth At Bedtime       sildenafil (VIAGRA) 100 MG tablet Take 100 mg by mouth daily as needed On hold at TCU         REVIEW OF SYSTEMS:  4 point ROS including Respiratory, CV, GI and , other than that noted in the HPI,  is negative    Objective:  BP 95/62   Pulse 87   Temp 98.4  F (36.9  C)   Resp 18   Wt 86.1 kg (189 lb 12.8 oz)   SpO2 97%   Exam:  Exam is limited due to COVID-19 precautions  GENERAL APPEARANCE:  Alert, in no distress, cooperative  ENT:  Mouth normal, moist mucous membranes, normal hearing acuity  EYES:  Conjunctiva and lids normal  RESP:  no respiratory distress, on room air  CV: no LE edema  NEURO:   No facial asymmetry, speech clear  PSYCH:  oriented X 3, affect and mood normal     Labs:   Most Recent 3 CBC's:  Recent Labs   Lab Test 12/29/20  0920 12/22/20  0902 12/21/20  0703 12/20/20  1041 12/19/20  0846   WBC  --  8.0  --  8.3 11.4*   HGB 13.1* 11.8* 12.2* 12.8* 13.6   MCV  --  107*  --  109* 106*   PLT  --  184  --  190 234     Most Recent 3 BMP's:  Recent Labs   Lab Test 12/22/20  0902 12/21/20  0703 12/20/20  1041 12/19/20  0846     --  139 138   POTASSIUM 4.6  --  4.3 4.1   CHLORIDE 108  --  106 106   CO2 30  --  28 25   BUN 6*  --  11 9   CR 0.68  --  0.93 1.00   ANIONGAP 2*  --  5 7   FADI 8.5  --  8.6 8.6   * 96 87 116*       ASSESSMENT/PLAN:  Left Intertrochanteric Femur Fracture s/p IM Nail (12/20/20)  Acute onset. Pain reported not managed well: does have APAP 650 mg q4h PRN, gabapentin 100 mg at bedtime (new), methocarbamol 500 mg BID PRN, oxycodone 5-10 mg q4h PRN. Will schedule tylenol, ask staff to offer Oxycodone " prior to therapies. Continue DVT ppx with rivaroxaban (chronic med). Monitor, ortho f/u as recommended.      Acute Blood Loss Anemia  Acute. Hgb improved. Monitor PRN.   Lab Results   Component Value Date    HGB 13.1 12/29/2020    HGB 11.8 12/22/2020       HTN  Chronic, BPs continues low at times despite decrease Norvasc dose last visit. Currently on amlodipine 2.5 mg daily, lisinopril 20 mg daily, metoprolol XL 50 mg daily. Stop amlodipine and decrease Lisinopril to 10 mg daily and review VS next visit.      Chronic Atrial Fibrillation  Chronic, stable. Continue metoprolol XL 50 mg daily and rivaroxaban 20 mg daily. F/U next visit.      DM, Type II  Chronic and diet controlled. No routine BG checks.      HLD  Chronic, continue PTA simvastatin 80 mg at bedtime.      Hypothyroidism  Chronic, continue synthroid as PTA and TSH monitoring per PCP.      Slow Transit Constipation  Continues to complaint of constipation. Increase Miralax to BID. Staff to update provider if not effective.      Fall  Physical Deconditioning  Acute, very slowly improving. Therapies - f/u with progress next visit.     Orders written by provider at facility:  1. discontinue Norvasc.   2. Increase Miralax 17 gm PO to twice daily diagnosis constipation  3. Decrease lisinopril to 10 mg PO daily diagnosis HTN  4. Change Tylenol to 1000 mg PO TID diagnosis pain. Stop PRN dose  5. discontinue robaxin, Zofran: not used  6. Make sure to offer PRN oxycodone every morning prior to therapies    Total unit/floor time of 39 minutes consisted of the following: Examination of patient, reviewing the record including pertinent labs and imaging, placing orders, and completing documentation.  More than 50% of this time (20 minutes) (>50%) was spent in coordination of care with the patient, nursing staff and other healthcare providers.   This time was spent on discussing the care plan including recent TCU course and concerns, medications, wound cares to incision,  discharge/safe placement, specialty follow up need.  Time was also spent on discussing POLST and code status.       Time with patient included: Discussion of diagnostic and imaging test results, diagnosis and prognosis, overall goal and disposition plan.      -Medical decision making and discussions included:  Risks/benefits of adjusting pain meds: schedule tylenol, good pain assessment for PRN oxycodone use  Risks/benefits of increase laxatives due to constipation  Wound care details open to air  PT/OT restrictions to include up with assist  DM management plan to include diet controlled, no routine BG checks  HTN management plan to include stop Norvasc, decrease lisinopril. Vs checks and f/u with ranges next visit  CKD management plan to include avoid nephrotoxic meds, monitor BMP per routine     -Rx management plan discussion included:  Follow up needed with ortho  Disposition and discharge plan to include likely need for home care after discharge  Medication changes to include schedule tylenol, increase laxatives and decrease BP meds  Patient education concerning POLST and completion of course    -Time on communication of care included:  Updated RN, RN manager, [facility] on above orders and POC   Updated PT/OT on pain management plan    Electronically signed by:  CECILIA Brand CNP

## 2021-01-07 LAB
SARS-COV-2 RNA RESP QL NAA+PROBE: NOT DETECTED
SPECIMEN SOURCE: NORMAL

## 2021-01-12 ENCOUNTER — HOSPITAL LABORATORY (OUTPATIENT)
Dept: OTHER | Facility: CLINIC | Age: 76
End: 2021-01-12

## 2021-01-12 VITALS
WEIGHT: 224 LBS | DIASTOLIC BLOOD PRESSURE: 61 MMHG | OXYGEN SATURATION: 97 % | TEMPERATURE: 97.5 F | SYSTOLIC BLOOD PRESSURE: 92 MMHG | RESPIRATION RATE: 18 BRPM | HEART RATE: 81 BPM

## 2021-01-13 ENCOUNTER — NURSING HOME VISIT (OUTPATIENT)
Dept: GERIATRICS | Facility: CLINIC | Age: 76
End: 2021-01-13
Payer: COMMERCIAL
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
## 2021-01-13 DIAGNOSIS — D62 ANEMIA DUE TO BLOOD LOSS, ACUTE: ICD-10-CM

## 2021-01-13 DIAGNOSIS — E03.9 HYPOTHYROIDISM, UNSPECIFIED TYPE: ICD-10-CM

## 2021-01-13 DIAGNOSIS — S72.142A CLOSED DISPLACED INTERTROCHANTERIC FRACTURE OF LEFT FEMUR, INITIAL ENCOUNTER (H): Primary | ICD-10-CM

## 2021-01-13 DIAGNOSIS — I10 ESSENTIAL HYPERTENSION: ICD-10-CM

## 2021-01-13 DIAGNOSIS — I48.20 CHRONIC ATRIAL FIBRILLATION (H): ICD-10-CM

## 2021-01-13 DIAGNOSIS — E11.9 TYPE 2 DIABETES MELLITUS WITHOUT COMPLICATION, WITHOUT LONG-TERM CURRENT USE OF INSULIN (H): ICD-10-CM

## 2021-01-13 DIAGNOSIS — E78.5 HYPERLIPIDEMIA, UNSPECIFIED HYPERLIPIDEMIA TYPE: ICD-10-CM

## 2021-01-13 DIAGNOSIS — R53.81 PHYSICAL DECONDITIONING: ICD-10-CM

## 2021-01-13 DIAGNOSIS — K59.01 SLOW TRANSIT CONSTIPATION: ICD-10-CM

## 2021-01-13 LAB
SARS-COV-2 RNA RESP QL NAA+PROBE: NOT DETECTED
SPECIMEN SOURCE: NORMAL

## 2021-01-13 PROCEDURE — 99309 SBSQ NF CARE MODERATE MDM 30: CPT | Performed by: NURSE PRACTITIONER

## 2021-01-13 NOTE — PROGRESS NOTES
"[place] [facility]  [place] Medical Record Number:  [id number]  Place of Service where encounter took place:  [facility] [[id number]]  Chief Complaint   Patient presents with     RECHECK       HPI:    Braxton Mantilla  is a 75 year old (1945), who is being seen today for an episodic care visit.  HPI information obtained from: facility chart records, facility staff, patient report and [facility] chart review.     Per recent TCU provider progress notes:  75 year old male with hx of HTN, atrial fibrillation and DM II. He was hospitalized after a fall and was found to have a left intertrochanteric femur fracture. He is s/p cephalomedullary nail (12/20/20). Surgery without complication. Developed acute blood loss anemia (Hgb 13.6 --> 11.9) He is new on gabapentin. Increased senna due to constipation.     Today's concern is:  Patient seen for episodic TCU visit.  Denies headaches, chest pain, dyspnea, bladder issues. Complains of dizziness when up at times despite recent decreases in BP meds. Regular bowel movements with current laxatives Per EMR review note BP range /61-78 and HR 68-100s. Sats 97% on room air. Per therapies walks 45 ft with 2WW and CGA. SLUMS 27/30.     Past Medical and Surgical History reviewed in Epic today.    MEDICATIONS:  Current Outpatient Medications   Medication Sig Dispense Refill     acetaminophen (TYLENOL) 500 MG tablet Take 1,000 mg by mouth 3 times daily       gabapentin (NEURONTIN) 100 MG capsule Take 1 capsule (100 mg) by mouth At Bedtime       levothyroxine (SYNTHROID/LEVOTHROID) 112 MCG tablet Take 112 mcg by mouth daily       lisinopril (ZESTRIL) 5 MG tablet Take 5 mg by mouth daily        metoprolol succinate ER (TOPROL-XL) 25 MG 24 hr tablet Take 25 mg by mouth daily        oxyCODONE (ROXICODONE) 5 MG tablet Take 1-2 tablets (5-10 mg) by mouth every 4 hours as needed for moderate to severe pain 1 tab po q 4 hrs prn pain scale \"1-5\"  2 tabs " "po q 4 hrs prn pain scale \"6-10\" 30 tablet 0     polyethylene glycol (MIRALAX) 17 g packet Take 1 packet by mouth 2 times daily        rivaroxaban ANTICOAGULANT (XARELTO) 20 MG TABS tablet Take 20 mg by mouth daily (with dinner)       simvastatin (ZOCOR) 80 MG tablet Take by mouth At Bedtime       sildenafil (VIAGRA) 100 MG tablet Take 100 mg by mouth daily as needed On hold at TCU         REVIEW OF SYSTEMS:  4 point ROS including Respiratory, CV, GI and , other than that noted in the HPI,  is negative    Objective:  BP 92/61   Pulse 81   Temp 97.5  F (36.4  C)   Resp 18   Wt 101.6 kg (224 lb)   SpO2 97%   Exam:  Exam is limited due to COVID-19 precautions  GENERAL APPEARANCE:  Alert, in no distress, cooperative  ENT:  Mouth normal, moist mucous membranes, normal hearing acuity  EYES:  Conjunctiva and lids normal  RESP:  no respiratory distress, on room air  CV: no LE edema  NEURO:   No facial asymmetry, speech clear  PSYCH:  oriented X 3, affect and mood normal     Labs:   Most Recent 3 CBC's:  Recent Labs   Lab Test 12/29/20  0920 12/22/20  0902 12/21/20  0703 12/20/20  1041 12/19/20  0846   WBC  --  8.0  --  8.3 11.4*   HGB 13.1* 11.8* 12.2* 12.8* 13.6   MCV  --  107*  --  109* 106*   PLT  --  184  --  190 234     Most Recent 3 BMP's:  Recent Labs   Lab Test 12/22/20  0902 12/21/20  0703 12/20/20  1041 12/19/20  0846     --  139 138   POTASSIUM 4.6  --  4.3 4.1   CHLORIDE 108  --  106 106   CO2 30  --  28 25   BUN 6*  --  11 9   CR 0.68  --  0.93 1.00   ANIONGAP 2*  --  5 7   FADI 8.5  --  8.6 8.6   * 96 87 116*       ASSESSMENT/PLAN:  Left Intertrochanteric Femur Fracture s/p IM Nail (12/20/20)  Acute onset with injury, surgery. Pain improved: takes Tylenol 1000 mg TID, gabapentin 100 mg at bedtime (new), methocarbamol 500 mg BID PRN, oxycodone 5-10 mg q4h PRN. Continue DVT ppx with rivaroxaban (chronic med). Monitor, ortho f/u as recommended.      Acute Blood Loss Anemia  Acute. Hgb improved. " Monitor PRN.   Lab Results   Component Value Date    HGB 13.1 12/29/2020    HGB 11.8 12/22/2020       HTN  Chronic, BPs continues low at times despite stopped Norvasc last visit. Currently on lisinopril 20 mg daily, metoprolol XL 50 mg daily. Will decrease both Lisinopril and Metoprolol dose and review VS next visit.      Chronic Atrial Fibrillation  Chronic, stable. Continue metoprolol XL 25 mg daily and rivaroxaban 20 mg daily. F/U next visit.      DM, Type II  Chronic and diet controlled. No routine BG checks.      HLD  Chronic, continue PTA simvastatin 80 mg at bedtime.      Hypothyroidism  Chronic, continue synthroid as PTA and TSH monitoring per PCP.      Slow Transit Constipation  Improved with increased Miralax to BID. Staff to update provider if any further issues.      Fall  Physical Deconditioning  Acute, slowly improving. Therapies - f/u with progress next visit.     Orders written by provider at facility:  1. Decrease lisinopril to 5 mg PO daily at HS - hold for SBP under 100. Hold today.  2. Decrease Toprol XL to 25 mg daily     Electronically signed by:  CECILIA Brand CNP

## 2021-01-19 ENCOUNTER — HOSPITAL LABORATORY (OUTPATIENT)
Dept: OTHER | Facility: CLINIC | Age: 76
End: 2021-01-19

## 2021-01-19 VITALS
OXYGEN SATURATION: 98 % | WEIGHT: 217 LBS | DIASTOLIC BLOOD PRESSURE: 75 MMHG | TEMPERATURE: 98.2 F | SYSTOLIC BLOOD PRESSURE: 114 MMHG | RESPIRATION RATE: 19 BRPM | HEART RATE: 75 BPM

## 2021-01-19 LAB
SARS-COV-2 RNA RESP QL NAA+PROBE: NOT DETECTED
SPECIMEN SOURCE: NORMAL

## 2021-01-19 NOTE — PROGRESS NOTES
"[place] [facility]  [place] Medical Record Number:  [id number]  Place of Service where encounter took place:  [facility] [[id number]]  Chief Complaint   Patient presents with     RECHECK       HPI:    Braxton Mantilla  is a 75 year old (1945), who is being seen today for an episodic care visit.  HPI information obtained from: facility chart records, facility staff, patient report and [facility] chart review.     Per recent TCU provider progress notes:  75 year old male with hx of HTN, atrial fibrillation and DM II. He was hospitalized after a fall and was found to have a left intertrochanteric femur fracture. He is s/p cephalomedullary nail (12/20/20). Surgery without complication. Developed acute blood loss anemia (Hgb 13.6 --> 11.9) He is new on gabapentin. Increased senna due to constipation.     Today's concern is:  Patient seen for episodic TCU visit.  Denies headaches, chest pain, dyspnea, bladder issues. Dizziness less frequent with decrease in BP meds. Regular bowel movements with current laxatives. Per EMR review note BP range /62-75 and HR 56-100s. Sats 98% on room air. Per therapies walks 50 ft with 2WW. SLUMS 27/30.     Past Medical and Surgical History reviewed in Epic today.    MEDICATIONS:  Current Outpatient Medications   Medication Sig Dispense Refill     acetaminophen (TYLENOL) 500 MG tablet Take 1,000 mg by mouth 3 times daily as needed for pain        gabapentin (NEURONTIN) 100 MG capsule Take 1 capsule (100 mg) by mouth At Bedtime       levothyroxine (SYNTHROID/LEVOTHROID) 112 MCG tablet Take 112 mcg by mouth daily       lisinopril (ZESTRIL) 5 MG tablet Take 2.5 mg by mouth daily        metoprolol succinate ER (TOPROL-XL) 25 MG 24 hr tablet Take 25 mg by mouth daily        oxyCODONE (ROXICODONE) 5 MG tablet Take 1-2 tablets (5-10 mg) by mouth every 4 hours as needed for moderate to severe pain 1 tab po q 4 hrs prn pain scale \"1-5\"  2 tabs po q 4 hrs " "prn pain scale \"6-10\" 30 tablet 0     polyethylene glycol (MIRALAX) 17 g packet Take 1 packet by mouth 2 times daily        rivaroxaban ANTICOAGULANT (XARELTO) 20 MG TABS tablet Take 20 mg by mouth daily (with dinner)       simvastatin (ZOCOR) 80 MG tablet Take by mouth At Bedtime       sildenafil (VIAGRA) 100 MG tablet Take 100 mg by mouth daily as needed On hold at TCU         REVIEW OF SYSTEMS:  4 point ROS including Respiratory, CV, GI and , other than that noted in the HPI,  is negative    Objective:  /75   Pulse 75   Temp 98.2  F (36.8  C)   Resp 19   Wt 98.4 kg (217 lb)   SpO2 98%   Exam:  Exam is limited due to COVID-19 precautions  GENERAL APPEARANCE:  Alert, in no distress, cooperative  ENT:  Mouth normal, moist mucous membranes, normal hearing acuity  EYES:  Conjunctiva and lids normal  RESP:  no respiratory distress, on room air  CV: no LE edema  NEURO:   No facial asymmetry, speech clear  PSYCH:  oriented X 3, affect and mood normal     Labs:   Most Recent 3 CBC's:  Recent Labs   Lab Test 12/29/20  0920 12/22/20  0902 12/21/20  0703 12/20/20  1041 12/19/20  0846   WBC  --  8.0  --  8.3 11.4*   HGB 13.1* 11.8* 12.2* 12.8* 13.6   MCV  --  107*  --  109* 106*   PLT  --  184  --  190 234     Most Recent 3 BMP's:  Recent Labs   Lab Test 12/22/20  0902 12/21/20  0703 12/20/20  1041 12/19/20  0846     --  139 138   POTASSIUM 4.6  --  4.3 4.1   CHLORIDE 108  --  106 106   CO2 30  --  28 25   BUN 6*  --  11 9   CR 0.68  --  0.93 1.00   ANIONGAP 2*  --  5 7   FADI 8.5  --  8.6 8.6   * 96 87 116*       ASSESSMENT/PLAN:  Left Intertrochanteric Femur Fracture s/p IM Nail (12/20/20)  Acute onset with injury, surgery. Pain improved: takes Tylenol, gabapentin, oxycodone 5-10 mg q4h PRN. Continue DVT ppx with rivaroxaban (chronic med). Monitor, ortho f/u as recommended.      Acute Blood Loss Anemia  Acute. Hgb improved. Monitor PRN.     HTN  Chronic, BPs improving since stopped Norvasc last " visit and decreased lisinopril to 5 mg daily, metoprolol XL 25 mg daily. Will decrease Lisinopril to 2.5 mg and review VS next visit.      Chronic Atrial Fibrillation  Chronic, stable. Continue metoprolol XL 25 mg daily and rivaroxaban 20 mg daily. F/U next visit.      DM, Type II  Chronic and diet controlled. No routine BG checks.      HLD  Chronic, continue PTA simvastatin 80 mg at bedtime.      Hypothyroidism  Chronic, continue synthroid as PTA and TSH monitoring per PCP.      Slow Transit Constipation  Improved with increased Miralax to BID. Staff to update provider if any further issues.      Fall  Physical Deconditioning  Acute, slowly improving. Therapies - f/u with progress next visit.     Orders written by provider at facility:  1. Decrease lisinopril to 2.5 mg PO daily at HS - hold for SBP under 100. Hold today.    Electronically signed by:  CECILIA Brand CNP

## 2021-01-21 ENCOUNTER — NURSING HOME VISIT (OUTPATIENT)
Dept: GERIATRICS | Facility: CLINIC | Age: 76
End: 2021-01-21
Payer: COMMERCIAL

## 2021-01-21 DIAGNOSIS — D62 ANEMIA DUE TO BLOOD LOSS, ACUTE: ICD-10-CM

## 2021-01-21 DIAGNOSIS — R53.81 PHYSICAL DECONDITIONING: ICD-10-CM

## 2021-01-21 DIAGNOSIS — E11.9 TYPE 2 DIABETES MELLITUS WITHOUT COMPLICATION, WITHOUT LONG-TERM CURRENT USE OF INSULIN (H): ICD-10-CM

## 2021-01-21 DIAGNOSIS — S72.142A CLOSED DISPLACED INTERTROCHANTERIC FRACTURE OF LEFT FEMUR, INITIAL ENCOUNTER (H): Primary | ICD-10-CM

## 2021-01-21 DIAGNOSIS — I48.20 CHRONIC ATRIAL FIBRILLATION (H): ICD-10-CM

## 2021-01-21 DIAGNOSIS — K59.01 SLOW TRANSIT CONSTIPATION: ICD-10-CM

## 2021-01-21 DIAGNOSIS — I10 ESSENTIAL HYPERTENSION: ICD-10-CM
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
## 2021-01-21 DIAGNOSIS — E78.5 HYPERLIPIDEMIA, UNSPECIFIED HYPERLIPIDEMIA TYPE: ICD-10-CM

## 2021-01-21 DIAGNOSIS — E03.9 HYPOTHYROIDISM, UNSPECIFIED TYPE: ICD-10-CM

## 2021-01-21 PROCEDURE — 99309 SBSQ NF CARE MODERATE MDM 30: CPT | Performed by: NURSE PRACTITIONER

## 2021-01-25 DIAGNOSIS — S72.142A CLOSED DISPLACED INTERTROCHANTERIC FRACTURE OF LEFT FEMUR, INITIAL ENCOUNTER (H): ICD-10-CM

## 2021-01-25 RX ORDER — OXYCODONE HYDROCHLORIDE 5 MG/1
TABLET ORAL
Qty: 30 TABLET | Refills: 0 | Status: SHIPPED | OUTPATIENT
Start: 2021-01-25 | End: 2021-02-09

## 2021-01-26 ENCOUNTER — HOSPITAL LABORATORY (OUTPATIENT)
Dept: OTHER | Facility: CLINIC | Age: 76
End: 2021-01-26

## 2021-01-26 VITALS
DIASTOLIC BLOOD PRESSURE: 71 MMHG | TEMPERATURE: 98.2 F | HEART RATE: 70 BPM | RESPIRATION RATE: 18 BRPM | OXYGEN SATURATION: 97 % | WEIGHT: 215 LBS | SYSTOLIC BLOOD PRESSURE: 111 MMHG

## 2021-01-27 ENCOUNTER — NURSING HOME VISIT (OUTPATIENT)
Dept: GERIATRICS | Facility: CLINIC | Age: 76
End: 2021-01-27
Payer: COMMERCIAL

## 2021-01-27 DIAGNOSIS — S72.142A CLOSED DISPLACED INTERTROCHANTERIC FRACTURE OF LEFT FEMUR, INITIAL ENCOUNTER (H): Primary | ICD-10-CM

## 2021-01-27 DIAGNOSIS — K59.01 SLOW TRANSIT CONSTIPATION: ICD-10-CM

## 2021-01-27 DIAGNOSIS — E11.9 TYPE 2 DIABETES MELLITUS WITHOUT COMPLICATION, WITHOUT LONG-TERM CURRENT USE OF INSULIN (H): ICD-10-CM

## 2021-01-27 DIAGNOSIS — D62 ANEMIA DUE TO BLOOD LOSS, ACUTE: ICD-10-CM

## 2021-01-27 DIAGNOSIS — E78.5 HYPERLIPIDEMIA, UNSPECIFIED HYPERLIPIDEMIA TYPE: ICD-10-CM

## 2021-01-27 DIAGNOSIS — I48.20 CHRONIC ATRIAL FIBRILLATION (H): ICD-10-CM

## 2021-01-27 DIAGNOSIS — E03.9 HYPOTHYROIDISM, UNSPECIFIED TYPE: ICD-10-CM

## 2021-01-27 DIAGNOSIS — I10 ESSENTIAL HYPERTENSION: ICD-10-CM

## 2021-01-27 DIAGNOSIS — L89.626 PRESSURE INJURY OF DEEP TISSUE OF LEFT HEEL: ICD-10-CM
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
## 2021-01-27 DIAGNOSIS — R53.81 PHYSICAL DECONDITIONING: ICD-10-CM

## 2021-01-27 LAB
LABORATORY COMMENT REPORT: NORMAL
SARS-COV-2 RNA RESP QL NAA+PROBE: NEGATIVE
SARS-COV-2 RNA RESP QL NAA+PROBE: NORMAL
SPECIMEN SOURCE: NORMAL
SPECIMEN SOURCE: NORMAL

## 2021-01-27 PROCEDURE — 99310 SBSQ NF CARE HIGH MDM 45: CPT | Performed by: NURSE PRACTITIONER

## 2021-01-27 NOTE — PROGRESS NOTES
[facility]  [place] Medical Record Number:  [id number]  Place of Service where encounter took place:  [facility] [[id number]]  Chief Complaint   Patient presents with     RECHECK       HPI:    Braxton Mantilla  is a 75 year old (1945), who is being seen today for an episodic care visit.  HPI information obtained from: facility chart records, facility staff, patient report and [facility] chart review.     Per recent TCU provider progress notes:  75 year old male with hx of HTN, atrial fibrillation and DM II. He was hospitalized after a fall and was found to have a left intertrochanteric femur fracture. He is s/p cephalomedullary nail (12/20/20). Surgery without complication. Developed acute blood loss anemia (Hgb 13.6 --> 11.9) He is new on gabapentin. Increased senna due to constipation.     Today's concern is:  Patient seen for episodic TCU visit.  Denies headaches, chest pain, dyspnea, bladder issues. Dizziness less frequent with decrease in BP meds. Regular bowel movements with current laxatives. Reports dry skin on feet, toenails need a trim. Also today reported left heel deep tissue injury - unsure when occurred and denies pain. Per EMR review note BP range  and HR 60-70s. Sats 97% on room air. Per therapies walks 120 ft with 2WW. SLUMS 27/30.     Past Medical and Surgical History reviewed in Epic today.    MEDICATIONS:  Current Outpatient Medications   Medication Sig Dispense Refill     acetaminophen (TYLENOL) 500 MG tablet Take 1,000 mg by mouth 3 times daily as needed for pain        gabapentin (NEURONTIN) 100 MG capsule Take 1 capsule (100 mg) by mouth At Bedtime       levothyroxine (SYNTHROID/LEVOTHROID) 112 MCG tablet Take 112 mcg by mouth daily       metoprolol succinate ER (TOPROL-XL) 25 MG 24 hr tablet Take 25 mg by mouth daily        oxyCODONE (ROXICODONE) 5 MG tablet TAKE ONE TO TWO TABLETS (5-10MG) BY MOUTH EVERY 4 HOURS AS NEEDED FOR MODERATE TO  SEVERE PAIN 2 30 tablet 0     polyethylene glycol (MIRALAX) 17 g packet Take 1 packet by mouth 2 times daily        rivaroxaban ANTICOAGULANT (XARELTO) 20 MG TABS tablet Take 20 mg by mouth daily (with dinner)       simvastatin (ZOCOR) 80 MG tablet Take by mouth At Bedtime       sildenafil (VIAGRA) 100 MG tablet Take 100 mg by mouth daily as needed On hold at TCU         REVIEW OF SYSTEMS:  4 point ROS including Respiratory, CV, GI and , other than that noted in the HPI,  is negative    Objective:  /71   Pulse 70   Temp 98.2  F (36.8  C)   Resp 18   Wt 97.5 kg (215 lb)   SpO2 97%   Exam:  Exam is limited due to COVID-19 precautions  GENERAL APPEARANCE:  Alert, in no distress, cooperative  ENT:  Mouth normal, moist mucous membranes, normal hearing acuity  EYES:  Conjunctiva and lids normal  RESP:  no respiratory distress, on room air  CV: no LE edema  SKIN: deep tissue injury 3.8 x 4 cm left lateral heel with serous drainage. Dry flaking skin on left foot  NEURO:   No facial asymmetry, speech clear  PSYCH:  oriented X 3, affect and mood normal     Labs:   Most Recent 3 CBC's:  Recent Labs   Lab Test 12/29/20  0920 12/22/20  0902 12/21/20  0703 12/20/20  1041 12/19/20  0846   WBC  --  8.0  --  8.3 11.4*   HGB 13.1* 11.8* 12.2* 12.8* 13.6   MCV  --  107*  --  109* 106*   PLT  --  184  --  190 234     Most Recent 3 BMP's:  Recent Labs   Lab Test 12/22/20  0902 12/21/20  0703 12/20/20  1041 12/19/20  0846     --  139 138   POTASSIUM 4.6  --  4.3 4.1   CHLORIDE 108  --  106 106   CO2 30  --  28 25   BUN 6*  --  11 9   CR 0.68  --  0.93 1.00   ANIONGAP 2*  --  5 7   FADI 8.5  --  8.6 8.6   * 96 87 116*       ASSESSMENT/PLAN:  Left Intertrochanteric Femur Fracture s/p IM Nail (12/20/20)  Acute onset with injury, surgery. Pain improved: takes Tylenol, gabapentin, oxycodone 5-10 mg q4h PRN. Continue DVT ppx with rivaroxaban (chronic med). Monitor, ortho f/u as recommended.      Acute Blood Loss  Anemia  Acute. Hgb improved. Monitor PRN.     HTN  Chronic, BPs improving since stopped Norvasc and decreased lisinopril to 2.5 mg daily, metoprolol XL to 25 mg daily. Will stop Lisinopril and review VS next visit.      Chronic Atrial Fibrillation  Chronic, stable. Continue metoprolol XL 25 mg daily and rivaroxaban 20 mg daily. F/U next visit.      DM, Type II  Chronic and diet controlled. No routine BG checks.      HLD  Chronic, continue PTA simvastatin 80 mg at bedtime.      Hypothyroidism  Chronic, continue synthroid as PTA and TSH monitoring per PCP.      Slow Transit Constipation  Improved with increased Miralax to BID. Staff to update provider if any further issues.      Fall  Physical Deconditioning  Acute, slowly improving. Therapies - f/u with progress next visit.     Deep tissue injury left heel  Newly noted. Cleanse, cover with foam, float heels and f/u with status later in the week or next visit.     Orders written by provider at facility:  1. discontinue lisinopril  2. Soak left foot in warm water - remove dry skin gently with washcloth. Lotion to feet daily and trim toenails  3. Cleanse left heel ulcer, cover with foam and change daily. Float heels in bed and heel protectors on at all times when not walking    Total unit/floor time of >36 minutes consisted of the following: Examination of patient, reviewing the record including pertinent labs and imaging, placing orders, and completing documentation.  More than 50% of this time (>19 minutes) (>50%) was spent in coordination of care with the patient, nursing staff and other healthcare providers.   This time was spent on discussing the care plan including recent TCU course and concerns, medications, wound cares to left heel DTI, discharge/safe placement, specialty follow up need.     Time with patient included: Discussion of diagnostic and imaging test results, diagnosis and prognosis, overall goal and disposition plan.      -Medical decision making and  discussions included:  Risks/benefits of DTI treatment, need to minimize pressure, float heels  Wound care details reviewed  PT/OT restrictions to include up with assist, avoid putting pressure on DTI  DM management plan to include monitor BG PRN  HTN management plan to include vs, meds as ordered and review next visit    -Rx management plan discussion included:  Follow up needed with ortho as recommended  Medication changes to include stop lisinopril  Patient education concerning care of heel DTI/ulcer    -Time on communication of care included:  Updated RN, RN manager, [facility] on above orders and POC, discussed new DTI and treatment  Updated PT/OT on wound left heel, avoid pressure.     Electronically signed by:  CECILIA Brand CNP

## 2021-01-28 VITALS
TEMPERATURE: 97.8 F | OXYGEN SATURATION: 98 % | DIASTOLIC BLOOD PRESSURE: 71 MMHG | WEIGHT: 217 LBS | SYSTOLIC BLOOD PRESSURE: 111 MMHG | RESPIRATION RATE: 19 BRPM | HEART RATE: 68 BPM

## 2021-01-29 ENCOUNTER — HOSPITAL LABORATORY (OUTPATIENT)
Dept: OTHER | Facility: CLINIC | Age: 76
End: 2021-01-29

## 2021-01-29 ENCOUNTER — NURSING HOME VISIT (OUTPATIENT)
Dept: GERIATRICS | Facility: CLINIC | Age: 76
End: 2021-01-29
Payer: COMMERCIAL

## 2021-01-29 DIAGNOSIS — I48.20 CHRONIC ATRIAL FIBRILLATION (H): ICD-10-CM

## 2021-01-29 DIAGNOSIS — E03.9 HYPOTHYROIDISM, UNSPECIFIED TYPE: ICD-10-CM

## 2021-01-29 DIAGNOSIS — G60.9 HEREDITARY AND IDIOPATHIC PERIPHERAL NEUROPATHY: Primary | ICD-10-CM

## 2021-01-29 DIAGNOSIS — S72.142A CLOSED DISPLACED INTERTROCHANTERIC FRACTURE OF LEFT FEMUR, INITIAL ENCOUNTER (H): ICD-10-CM

## 2021-01-29 DIAGNOSIS — I10 ESSENTIAL HYPERTENSION: ICD-10-CM

## 2021-01-29 DIAGNOSIS — E11.9 TYPE 2 DIABETES MELLITUS WITHOUT COMPLICATION, WITHOUT LONG-TERM CURRENT USE OF INSULIN (H): ICD-10-CM

## 2021-01-29 PROCEDURE — 99310 SBSQ NF CARE HIGH MDM 45: CPT | Performed by: NURSE PRACTITIONER

## 2021-01-29 NOTE — PROGRESS NOTES
[facility]  [place] Medical Record Number:  [id number]  Place of Service where encounter took place:  [facility] [[id number]]  Chief Complaint   Patient presents with     RECHECK       HPI:    Braxton Mantilla  is a 75 year old (1945), who is being seen today for an episodic care visit.  HPI information obtained from: facility chart records, facility staff, patient report and [facility] chart review.     Per chart review:  Here is a 75 year old male with hx of HTN, atrial fibrillation and DM II. He was hospitalized after a fall and was found to have a left intertrochanteric femur fracture. He is s/p cephalomedullary nailing on(12/20/20. Surgery without complication. Developed acute blood loss anemia (Hgb 13.6 --> 11.9) He is new on gabapentin. Increased senna due to constipation.     Today's concern is:  Deep tissue injury on L heel has become PU stage 2    Past Medical and Surgical History reviewed in Epic today.    MEDICATIONS:  Current Outpatient Medications   Medication     cephALEXin (KEFLEX) 500 MG capsule     acetaminophen (TYLENOL) 500 MG tablet     gabapentin (NEURONTIN) 100 MG capsule     levothyroxine (SYNTHROID/LEVOTHROID) 112 MCG tablet     metoprolol succinate ER (TOPROL-XL) 25 MG 24 hr tablet     oxyCODONE (ROXICODONE) 5 MG tablet     polyethylene glycol (MIRALAX) 17 g packet     rivaroxaban ANTICOAGULANT (XARELTO) 20 MG TABS tablet     sildenafil (VIAGRA) 100 MG tablet     simvastatin (ZOCOR) 80 MG tablet     No current facility-administered medications for this visit.          REVIEW OF SYSTEMS:   10 point ROS of systems including Constitutional, Eyes, Respiratory, Cardiovascular, Gastroenterology, Genitourinary, Integumentary, Musculoskeletal, Psychiatric were all negative except for pertinent positives noted in my HPI.    Objective:  /71   Pulse 68   Temp 97.8  F (36.6  C)   Resp 19   Wt 98.4 kg (217 lb)   SpO2 98%   Exam:  GENERAL  APPEARANCE:  Alert, oriented, more neuropathic pain  ENT:  Mouth and posterior oropharynx normal, moist mucous membranes, normal hearing acuity  NECK:  No adenopathy,masses or thyromegaly  RESP:  respiratory effort and palpation of chest normal, lungs clear to auscultation , no respiratory distress  CV:  Palpation and auscultation of heart done , no edema, irregular rhythm per A-fib, rate controlled  ABDOMEN:  normal bowel sounds, soft, nontender, no hepatosplenomegaly or other masses, bowel sounds normal  M/S:   Gait and station normal  SKIN:  Inspection of skin and subcutaneous tissueL LE intact and L heel now PU stage 2, drainage present, pungent order, wound appearance: open  NEURO:   Cranial nerves 2-12 are normal tested and grossly at patient's baseline  PSYCH:  oriented X 3, affect and mood normal    Labs:   Recent labs in [facility] reviewed by me today.     ASSESSMENT/PLAN:    L heel PU, stage 2, infection present  Culture sent, start keflex 500mg QID x1wk, drsg changes ordered and floating heels    Hereditary and idiopathic peripheral neuropathy  Per increased pain, increase gabapentin to 100mg TID    Pain control  Continue tylenol PRN and oxycodone (only using opiates at HS per sleep), plan to titrate soon    Hypothyroidism, unspecified type  Continue synthroid 112mcg daily, no TSH available    Type 2 diabetes mellitus without complication, without long-term current use of insulin (H)  Diet controlled, no A1c    Essential hypertension  Continue metoprolol succinate 25mg daily, per chart review lisinopril and amlodipine have been discontinued. SBP <110, HR <70    Closed displaced intertrochanteric fracture of left femur, initial encounter (H)  WBAT, f/u with ortho. No drsg, incision intact    Chronic atrial fibrillation (H)  Continue xarelto 20mg daily    Constipation  Continue miralax BID, effective    HLD  Continue PTA statin      Orders written by provider at facility  Culture L  Heel, start keflex, increase  gabapentin    Total time spent with patient visit at the skilled nursing facility was 38 including patient visit and review of past records. Greater than 50% of total time spent with counseling and coordinating care due to heel wound, pain control and therapy, which lasted 20 minutes.     Electronically signed by:  Jed Oconnor NP

## 2021-01-30 RX ORDER — CEPHALEXIN 500 MG/1
500 CAPSULE ORAL 4 TIMES DAILY
COMMUNITY
Start: 2021-01-29 | End: 2021-02-05

## 2021-02-01 ENCOUNTER — NURSING HOME VISIT (OUTPATIENT)
Dept: GERIATRICS | Facility: CLINIC | Age: 76
End: 2021-02-01

## 2021-02-01 VITALS
WEIGHT: 217 LBS | RESPIRATION RATE: 18 BRPM | HEART RATE: 73 BPM | SYSTOLIC BLOOD PRESSURE: 117 MMHG | DIASTOLIC BLOOD PRESSURE: 75 MMHG | OXYGEN SATURATION: 100 % | TEMPERATURE: 98.2 F

## 2021-02-01 DIAGNOSIS — R53.81 PHYSICAL DECONDITIONING: ICD-10-CM

## 2021-02-01 DIAGNOSIS — E03.9 HYPOTHYROIDISM, UNSPECIFIED TYPE: ICD-10-CM

## 2021-02-01 DIAGNOSIS — E78.5 HYPERLIPIDEMIA, UNSPECIFIED HYPERLIPIDEMIA TYPE: ICD-10-CM
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
## 2021-02-01 DIAGNOSIS — L08.9 WOUND INFECTION: ICD-10-CM

## 2021-02-01 DIAGNOSIS — K59.01 SLOW TRANSIT CONSTIPATION: ICD-10-CM

## 2021-02-01 DIAGNOSIS — I10 ESSENTIAL HYPERTENSION: ICD-10-CM

## 2021-02-01 DIAGNOSIS — E11.9 TYPE 2 DIABETES MELLITUS WITHOUT COMPLICATION, WITHOUT LONG-TERM CURRENT USE OF INSULIN (H): ICD-10-CM

## 2021-02-01 DIAGNOSIS — D62 ANEMIA DUE TO BLOOD LOSS, ACUTE: ICD-10-CM

## 2021-02-01 DIAGNOSIS — T14.8XXA WOUND INFECTION: ICD-10-CM

## 2021-02-01 DIAGNOSIS — I48.20 CHRONIC ATRIAL FIBRILLATION (H): ICD-10-CM

## 2021-02-01 DIAGNOSIS — L89.626 PRESSURE INJURY OF DEEP TISSUE OF LEFT HEEL: ICD-10-CM

## 2021-02-01 DIAGNOSIS — S72.145D CLOSED NONDISPLACED INTERTROCHANTERIC FRACTURE OF LEFT FEMUR WITH ROUTINE HEALING, SUBSEQUENT ENCOUNTER: Primary | ICD-10-CM

## 2021-02-01 LAB
BACTERIA SPEC CULT: ABNORMAL
Lab: ABNORMAL
SPECIMEN SOURCE: ABNORMAL

## 2021-02-01 PROCEDURE — 99310 SBSQ NF CARE HIGH MDM 45: CPT | Performed by: NURSE PRACTITIONER

## 2021-02-01 NOTE — PROGRESS NOTES
[facility]  [place] Medical Record Number:  [id number]  Place of Service where encounter took place:  [facility] [[id number]]  Chief Complaint   Patient presents with     RECHECK       HPI:    Braxton Mantilla  is a 75 year old (1945), who is being seen today for an episodic care visit.  HPI information obtained from: facility chart records, facility staff, patient report and [facility] chart review.     Per recent TCU provider progress notes:  75 year old male with hx of HTN, atrial fibrillation and DM II. He was hospitalized after a fall and was found to have a left intertrochanteric femur fracture. He is s/p cephalomedullary nail (12/20/20). Surgery without complication. Developed acute blood loss anemia (Hgb 13.6 --> 11.9) He is new on gabapentin. Increased senna due to constipation. Developed left heel DTI: foam dressing, TTWB left foot and started Keflex x 7 days.     Today's concern is:  Patient seen for episodic TCU visit.  Denies headaches, chest pain, dyspnea, bladder issues. Dizziness less frequent with decrease in BP meds. Regular bowel movements with current laxatives. Increased purulent drainage left heel deep tissue injury last week - started Keflex, wound cultured and results show two organism strains, susceptible to different antibiotics. Per EMR review no fevers, BP range 103-120/66-74 and sats 97% on room air. Per therapies currently TTWB left foot due to ulcer so transfers with SBA. SLUMS 27/30.     Past Medical and Surgical History reviewed in Epic today.    MEDICATIONS:  Current Outpatient Medications   Medication Sig Dispense Refill     acetaminophen (TYLENOL) 500 MG tablet Take 1,000 mg by mouth 3 times daily as needed for pain        gabapentin (NEURONTIN) 100 MG capsule Take 1 capsule (100 mg) by mouth At Bedtime       levothyroxine (SYNTHROID/LEVOTHROID) 112 MCG tablet Take 112 mcg by mouth daily       metoprolol succinate ER (TOPROL-XL) 25  MG 24 hr tablet Take 25 mg by mouth daily        oxyCODONE (ROXICODONE) 5 MG tablet TAKE ONE TO TWO TABLETS (5-10MG) BY MOUTH EVERY 4 HOURS AS NEEDED FOR MODERATE TO SEVERE PAIN 2 30 tablet 0     polyethylene glycol (MIRALAX) 17 g packet Take 1 packet by mouth 2 times daily        rivaroxaban ANTICOAGULANT (XARELTO) 20 MG TABS tablet Take 20 mg by mouth daily (with dinner)       simvastatin (ZOCOR) 80 MG tablet Take by mouth At Bedtime       sildenafil (VIAGRA) 100 MG tablet Take 100 mg by mouth daily as needed On hold at TCU         REVIEW OF SYSTEMS:  4 point ROS including Respiratory, CV, GI and , other than that noted in the HPI,  is negative    Objective:  /75   Pulse 73   Temp 98.2  F (36.8  C)   Resp 18   Wt 98.4 kg (217 lb)   SpO2 100%   Exam:  Exam is limited due to COVID-19 precautions  GENERAL APPEARANCE:  Alert, in no distress, cooperative  ENT:  Mouth normal, moist mucous membranes, normal hearing acuity  EYES:  Conjunctiva and lids normal  RESP:  no respiratory distress, on room air  CV: no LE edema  SKIN: deep tissue injury 3.8 x 4 cm left lateral heel with eschar center of wound and purulent drainage and macerated skin with use of foam dressing.   NEURO:   No facial asymmetry, speech clear  PSYCH:  oriented X 3, affect and mood normal     Labs:   Most Recent 3 CBC's:  Recent Labs   Lab Test 12/29/20  0920 12/22/20  0902 12/21/20  0703 12/20/20  1041 12/19/20  0846   WBC  --  8.0  --  8.3 11.4*   HGB 13.1* 11.8* 12.2* 12.8* 13.6   MCV  --  107*  --  109* 106*   PLT  --  184  --  190 234     Most Recent 3 BMP's:  Recent Labs   Lab Test 12/22/20  0902 12/21/20  0703 12/20/20  1041 12/19/20  0846     --  139 138   POTASSIUM 4.6  --  4.3 4.1   CHLORIDE 108  --  106 106   CO2 30  --  28 25   BUN 6*  --  11 9   CR 0.68  --  0.93 1.00   ANIONGAP 2*  --  5 7   FADI 8.5  --  8.6 8.6   * 96 87 116*       ASSESSMENT/PLAN:  Left Intertrochanteric Femur Fracture s/p IM Nail  (12/20/20)  Acute onset with injury, surgery. Pain improved: takes Tylenol PRN, gabapentin, oxycodone 5-10 mg q4h PRN. Continue DVT ppx with rivaroxaban (chronic med). Monitor, ortho f/u as recommended.      Acute Blood Loss Anemia  Acute. Hgb improved. Monitor PRN.     HTN  Chronic, BPs improved since stopped Norvasc and lisinopril. Decreased metoprolol XL to 25 mg daily. No changes and review VS next visit.      Chronic Atrial Fibrillation  Chronic, stable. Continue metoprolol XL 25 mg daily and rivaroxaban 20 mg daily. F/U next visit.      DM, Type II  Chronic and diet controlled. No routine BG checks.      HLD  Chronic, continue PTA simvastatin 80 mg at bedtime.      Hypothyroidism  Chronic, continue synthroid as PTA and TSH monitoring per PCP.      Slow Transit Constipation  Improved with increased Miralax to BID. Staff to update provider if any further issues.      Fall  Physical Deconditioning  Acute, slowly improving. Therapies - f/u with progress next visit.     Deep tissue injury left heel  Wound infection  Newly noted last week, purulent drainage. Started Keflex and cultured wound - two strains of Staphylococcus aureus with different sensitivities. Complete keflex course, add clindamycin and labs this week. New wound care orders as noted below and f/u with wound in the next few days or sooner PRN.     Orders written by provider at facility:  1. Continue Keflex as ordered through 2/4/21  2. Today add Clindamycin 300 mg PO QID x 10 days diagnosis wound infection  3. CBC. BMP on 2/2 diagnosis infection  4. Change left heel ulcer dressing orders to the following:  --cleanse ulcer with wound spray and pat dry  --Paint eschar with betadine  --Paint periwound skin with skin barrier  --Cover with gauze and wrap with Kerlix, change twice daily for now.   Continue to use heel protectors and float heels while at rest.     Total unit/floor time of >36 minutes consisted of the following: Examination of patient,  reviewing the record including pertinent labs and imaging, placing orders, and completing documentation.  More than 50% of this time (>19 minutes) (>50%) was spent in coordination of care with the patient, nursing staff and other healthcare providers.   This time was spent on discussing the care plan including recent TCU course and concerns, medications, wound cares to left heel DTI, specialty follow up need.     Time with patient included: Discussion of diagnostic test results, diagnosis and prognosis, overall goal and disposition plan.      -Medical decision making and discussions included:  Risks/benefits of DTI treatment, need to minimize pressure, float heels and antibiotics/frequent wound care. CBC and BMP check tomorrow.   Wound care details reviewed  PT/OT restrictions to include up with assist, avoid putting pressure on DTI - recommend TTWB left foot at this time  DM management plan to include monitor BG PRN  HTN management plan to include vs, meds as ordered and review next visit    -Rx management plan discussion included:  Follow up needed with ortho as recommended  Medication changes to include add clindamycin  Patient education concerning care of heel DTI/ulcer    -Time on communication of care included:  Updated RN, RN manager, [facility] on above orders and POC, discussed new DTI and treatment    Electronically signed by:  CECILIA Brand CNP

## 2021-02-02 ENCOUNTER — HOSPITAL LABORATORY (OUTPATIENT)
Dept: OTHER | Facility: CLINIC | Age: 76
End: 2021-02-02

## 2021-02-02 VITALS
RESPIRATION RATE: 18 BRPM | HEART RATE: 70 BPM | DIASTOLIC BLOOD PRESSURE: 87 MMHG | WEIGHT: 217 LBS | OXYGEN SATURATION: 98 % | SYSTOLIC BLOOD PRESSURE: 130 MMHG | TEMPERATURE: 98.4 F

## 2021-02-02 LAB
ANION GAP SERPL CALCULATED.3IONS-SCNC: 4 MMOL/L (ref 3–14)
BUN SERPL-MCNC: 20 MG/DL (ref 7–30)
CALCIUM SERPL-MCNC: 9.1 MG/DL (ref 8.5–10.1)
CHLORIDE SERPL-SCNC: 103 MMOL/L (ref 94–109)
CO2 SERPL-SCNC: 32 MMOL/L (ref 20–32)
CREAT SERPL-MCNC: 0.8 MG/DL (ref 0.66–1.25)
ERYTHROCYTE [DISTWIDTH] IN BLOOD BY AUTOMATED COUNT: 13.2 % (ref 10–15)
GFR SERPL CREATININE-BSD FRML MDRD: 88 ML/MIN/{1.73_M2}
GLUCOSE SERPL-MCNC: 113 MG/DL (ref 70–99)
HCT VFR BLD AUTO: 39.7 % (ref 40–53)
HGB BLD-MCNC: 12.8 G/DL (ref 13.3–17.7)
MCH RBC QN AUTO: 32.3 PG (ref 26.5–33)
MCHC RBC AUTO-ENTMCNC: 32.2 G/DL (ref 31.5–36.5)
MCV RBC AUTO: 100 FL (ref 78–100)
PLATELET # BLD AUTO: 320 10E9/L (ref 150–450)
POTASSIUM SERPL-SCNC: 3.5 MMOL/L (ref 3.4–5.3)
RBC # BLD AUTO: 3.96 10E12/L (ref 4.4–5.9)
SODIUM SERPL-SCNC: 139 MMOL/L (ref 133–144)
WBC # BLD AUTO: 7.8 10E9/L (ref 4–11)

## 2021-02-03 ENCOUNTER — NURSING HOME VISIT (OUTPATIENT)
Dept: GERIATRICS | Facility: CLINIC | Age: 76
End: 2021-02-03

## 2021-02-03 DIAGNOSIS — L89.626 PRESSURE INJURY OF DEEP TISSUE OF LEFT HEEL: Primary | ICD-10-CM

## 2021-02-03 DIAGNOSIS — L08.9 WOUND INFECTION: ICD-10-CM

## 2021-02-03 DIAGNOSIS — R53.81 PHYSICAL DECONDITIONING: ICD-10-CM

## 2021-02-03 DIAGNOSIS — T14.8XXA WOUND INFECTION: ICD-10-CM

## 2021-02-03 PROCEDURE — 99309 SBSQ NF CARE MODERATE MDM 30: CPT | Performed by: NURSE PRACTITIONER

## 2021-02-03 NOTE — PROGRESS NOTES
[facility]  [place] Medical Record Number:  [id number]  Place of Service where encounter took place:  [facility] [[id number]]  Chief Complaint   Patient presents with     RECHECK       HPI:    Braxton aMntilla  is a 75 year old (1945), who is being seen today for an episodic care visit.  HPI information obtained from: facility chart records, facility staff, patient report and [facility] chart review.     Today's concern is:  Pressure injury of deep tissue of left heel  Wound infection  Physical deconditioning  Patient developed left heel DTI last week, initially used foam dressing but due to purulent drainage started antibiotics and switched to betadine, wound cleanser and gauze dressing on 2/1. Report no fevers, no pain. -130/72-87 and HR 70-80s with sats 96% on room air.       Past Medical and Surgical History reviewed in Epic today.    MEDICATIONS:  Current Outpatient Medications   Medication Sig Dispense Refill     acetaminophen (TYLENOL) 500 MG tablet Take 1,000 mg by mouth 3 times daily as needed for pain        cephALEXin (KEFLEX) 500 MG capsule Take 500 mg by mouth 4 times daily       gabapentin (NEURONTIN) 100 MG capsule Take 1 capsule (100 mg) by mouth At Bedtime (Patient taking differently: Take 100 mg by mouth 3 times daily )       levothyroxine (SYNTHROID/LEVOTHROID) 112 MCG tablet Take 112 mcg by mouth daily       metoprolol succinate ER (TOPROL-XL) 25 MG 24 hr tablet Take 25 mg by mouth daily        oxyCODONE (ROXICODONE) 5 MG tablet TAKE ONE TO TWO TABLETS (5-10MG) BY MOUTH EVERY 4 HOURS AS NEEDED FOR MODERATE TO SEVERE PAIN  30 tablet 0     polyethylene glycol (MIRALAX) 17 g packet Take 1 packet by mouth 2 times daily        rivaroxaban ANTICOAGULANT (XARELTO) 20 MG TABS tablet Take 20 mg by mouth daily (with dinner)       sildenafil (VIAGRA) 100 MG tablet Take 100 mg by mouth daily as needed On hold at [facility]       simvastatin (ZOCOR) 80 MG tablet  Take by mouth At Bedtime       REVIEW OF SYSTEMS:  4 point ROS including Respiratory, CV, GI and , other than that noted in the HPI,  is negative    Objective:  /87   Pulse 70   Temp 98.4  F (36.9  C)   Resp 18   Wt 98.4 kg (217 lb)   SpO2 98%   Exam:  GENERAL APPEARANCE:  Alert, in no distress, cooperative  ENT:  Mouth normal, moist mucous membranes, normal hearing acuity  EYES:  Conjunctiva and lids normal  RESP:  no respiratory distress, on room air  CV: no LE edema  SKIN: deep tissue injury 3.8 x 4 cm left lateral heel with eschar center of wound and small amount dry drainage on dressing, drying periwound area.    NEURO:   No facial asymmetry, speech clear  PSYCH:  oriented X 3, affect and mood normal           Labs:   Most Recent 3 CBC's:  Recent Labs   Lab Test 02/02/21  0713 12/29/20  0920 12/22/20  0902 12/20/20  1041 12/20/20  1041   WBC 7.8  --  8.0  --  8.3   HGB 12.8* 13.1* 11.8*   < > 12.8*     --  107*  --  109*     --  184  --  190    < > = values in this interval not displayed.       ASSESSMENT/PLAN:  1. Pressure injury of deep tissue of left heel    2. Wound infection    3. Physical deconditioning    Acute onset, improving. Less drainage, drying eschar. Continue antibiotics and wound care as ordered. F/u in the next 3-5 days or sooner PRN. Update provider if any deterioration.     Orders written by provider at facility  None     Electronically signed by:  CECILIA Brand CNP

## 2021-02-04 ENCOUNTER — HOSPITAL ENCOUNTER (OUTPATIENT)
Dept: WOUND CARE | Facility: CLINIC | Age: 76
End: 2021-02-04
Attending: SURGERY
Payer: MEDICARE

## 2021-02-04 ENCOUNTER — HOSPITAL LABORATORY (OUTPATIENT)
Dept: OTHER | Facility: CLINIC | Age: 76
End: 2021-02-04

## 2021-02-04 DIAGNOSIS — I73.9 PAD (PERIPHERAL ARTERY DISEASE) (H): Primary | ICD-10-CM

## 2021-02-04 DIAGNOSIS — L89.623 PRESSURE INJURY OF LEFT HEEL, STAGE 3 (H): ICD-10-CM

## 2021-02-04 DIAGNOSIS — E11.621 TYPE 2 DIABETES MELLITUS WITH FOOT ULCER, WITHOUT LONG-TERM CURRENT USE OF INSULIN (H): ICD-10-CM

## 2021-02-04 DIAGNOSIS — L97.509 TYPE 2 DIABETES MELLITUS WITH FOOT ULCER, WITHOUT LONG-TERM CURRENT USE OF INSULIN (H): ICD-10-CM

## 2021-02-04 LAB
SARS-COV-2 RNA RESP QL NAA+PROBE: NORMAL
SPECIMEN SOURCE: NORMAL

## 2021-02-04 PROCEDURE — 99213 OFFICE O/P EST LOW 20 MIN: CPT | Mod: 95 | Performed by: SURGERY

## 2021-02-04 RX ORDER — MULTIVIT WITH MINERALS/LUTEIN
1000 TABLET ORAL 2 TIMES DAILY
Qty: 90 TABLET | Refills: 3 | Status: SHIPPED | OUTPATIENT
Start: 2021-02-04

## 2021-02-04 RX ORDER — LANOLIN ALCOHOL/MO/W.PET/CERES
800 CREAM (GRAM) TOPICAL DAILY
Qty: 90 TABLET | Refills: 3 | Status: SHIPPED | OUTPATIENT
Start: 2021-02-04

## 2021-02-04 RX ORDER — LANOLIN ALCOHOL/MO/W.PET/CERES
1000 CREAM (GRAM) TOPICAL DAILY
Qty: 90 TABLET | Refills: 3 | Status: SHIPPED | OUTPATIENT
Start: 2021-02-04

## 2021-02-04 NOTE — PROGRESS NOTES
"Braxton Mantilla is a 75 year old male who is being evaluated via a billable telephone visit.      The patient has been notified of following:     \"This telephone visit will be conducted via a call between you and your physician/provider. We have found that certain health care needs can be provided without the need for a physical exam.  This service lets us provide the care you need with a short phone conversation.  If a prescription is necessary we can send it directly to your pharmacy.  If lab work is needed we can place an order for that and you can then stop by our lab to have the test done at a later time.    If during the course of the call the physician/provider feels a telephone visit is not appropriate, you will not be charged for this service.\"     Physician has received verbal consent for a Telephone Visit from the patient? Yes    Braxton Mantilla history of extensive tobacco utilization, fell fracturing left hip, required surgical procedure, 2-day hospitalization at [facility], subsequent transfer to [facility], discussing with nurse present on phone, patient has a ulceration developed on the lateral aspect of the left foot. Given extensive history of tobacco utilization up until time of fracture within the past several weeks, peripheral arterial disease is strong consideration for cause of left heel ulceration. Patient is having difficulty weightbearing on left foot due to the significance of the pain.  Chief Complaint   Patient presents with     WOUND CARE       I have reviewed and updated the patient's Past Medical History, Social History, Family History and Medication List.    ALLERGIES  Patient has no known allergies.       Additional provider notes: Photodocumentation reviewed, appears to be an ischemic ulceration left foot status post left hip surgical repair for fracture, history of extensive tobacco utilization up until the time of trauma and hip repair.    Assessment/Plan: Probable " extensive peripheral arterial disease left lower extremity related to tobacco utilization, status post left total hip arthroplasty after fracture from fall    Obtain ankle-brachial indices and duplex ultrasound left lower extremity, if confirmed to be of significance, will proceed with angiography of the left lower extremity. Maintain wound dry with Betadine painting, place and Rooke boot for pressure ulcer prevention and to prevent passive heel loss which would potentially increase microvascular dysfunction and ischemia. Will follow up Monday upon completion of studies. Adjunctive micronutrients discussed, vitamin D 10,000 is daily, B12 1 mg daily, folate 1 mg daily, vitamin C2 1000 mg daily, he is currently on Coleman 1 pack daily.      Phone call duration: 12 minutes    Skip Mauricio MD

## 2021-02-04 NOTE — DISCHARGE INSTRUCTIONS
[facility]  [address] [address]    Call us at [phone] if you have any questions about your wounds, have redness or swelling around your wound, have a fever of 101 or greater or if you have any other problems or concerns. We answer the phone Monday through Friday 8 am to 4 pm, please leave a message as we check the voicemail frequently throughout the day.     Braxton Mantilla      1945  [place] Fax:[phone]    Wound Dressing Change:Left Foot   Cleanse wound with wound cleanser  Paint wound with betadine gauze and roll gauze   Change dressing twice a day     Rooke boot on at all times.Toe Touch Weight bearing with gini Mauricio M.D.. February 4, 2021      If you had a positive experience please indicate on your patient satisfaction survey form from [facility].

## 2021-02-05 ENCOUNTER — HOSPITAL ENCOUNTER (OUTPATIENT)
Dept: ULTRASOUND IMAGING | Facility: CLINIC | Age: 76
End: 2021-02-05
Attending: SURGERY

## 2021-02-05 DIAGNOSIS — I73.9 PAD (PERIPHERAL ARTERY DISEASE) (H): ICD-10-CM

## 2021-02-05 DIAGNOSIS — L97.509 TYPE 2 DIABETES MELLITUS WITH FOOT ULCER, WITHOUT LONG-TERM CURRENT USE OF INSULIN (H): ICD-10-CM

## 2021-02-05 DIAGNOSIS — E11.621 TYPE 2 DIABETES MELLITUS WITH FOOT ULCER, WITHOUT LONG-TERM CURRENT USE OF INSULIN (H): ICD-10-CM

## 2021-02-05 LAB
LABORATORY COMMENT REPORT: NORMAL
SARS-COV-2 RNA RESP QL NAA+PROBE: NEGATIVE
SPECIMEN SOURCE: NORMAL

## 2021-02-05 PROCEDURE — 93922 UPR/L XTREMITY ART 2 LEVELS: CPT

## 2021-02-05 PROCEDURE — 93926 LOWER EXTREMITY STUDY: CPT | Mod: LT

## 2021-02-09 ENCOUNTER — TELEPHONE (OUTPATIENT)
Dept: WOUND CARE | Facility: CLINIC | Age: 76
End: 2021-02-09

## 2021-02-09 ENCOUNTER — HOSPITAL LABORATORY (OUTPATIENT)
Dept: OTHER | Facility: CLINIC | Age: 76
End: 2021-02-09

## 2021-02-09 DIAGNOSIS — S72.142A CLOSED DISPLACED INTERTROCHANTERIC FRACTURE OF LEFT FEMUR, INITIAL ENCOUNTER (H): ICD-10-CM

## 2021-02-09 LAB
SARS-COV-2 RNA RESP QL NAA+PROBE: NORMAL
SPECIMEN SOURCE: NORMAL

## 2021-02-09 RX ORDER — OXYCODONE HYDROCHLORIDE 5 MG/1
5-10 TABLET ORAL EVERY 4 HOURS PRN
Qty: 30 TABLET | Refills: 0 | Status: SHIPPED | OUTPATIENT
Start: 2021-02-09 | End: 2021-02-10

## 2021-02-10 VITALS
WEIGHT: 216.6 LBS | TEMPERATURE: 97.1 F | SYSTOLIC BLOOD PRESSURE: 117 MMHG | RESPIRATION RATE: 16 BRPM | OXYGEN SATURATION: 98 % | HEART RATE: 83 BPM | DIASTOLIC BLOOD PRESSURE: 76 MMHG

## 2021-02-10 DIAGNOSIS — S72.142A CLOSED DISPLACED INTERTROCHANTERIC FRACTURE OF LEFT FEMUR, INITIAL ENCOUNTER (H): ICD-10-CM

## 2021-02-10 RX ORDER — OXYCODONE HYDROCHLORIDE 5 MG/1
5-10 TABLET ORAL EVERY 4 HOURS PRN
Qty: 30 TABLET | Refills: 0 | Status: SHIPPED | OUTPATIENT
Start: 2021-02-10 | End: 2021-02-17 | Stop reason: DRUGHIGH

## 2021-02-11 ENCOUNTER — HOSPITAL ENCOUNTER (OUTPATIENT)
Dept: WOUND CARE | Facility: CLINIC | Age: 76
Discharge: HOME | End: 2021-02-11
Attending: SURGERY | Admitting: SURGERY
Payer: COMMERCIAL

## 2021-02-11 VITALS — SYSTOLIC BLOOD PRESSURE: 113 MMHG | TEMPERATURE: 97.6 F | DIASTOLIC BLOOD PRESSURE: 71 MMHG | HEART RATE: 92 BPM

## 2021-02-11 DIAGNOSIS — L89.623 PRESSURE INJURY OF LEFT HEEL, STAGE 3 (H): ICD-10-CM

## 2021-02-11 PROCEDURE — 88305 TISSUE EXAM BY PATHOLOGIST: CPT | Mod: 26 | Performed by: DERMATOLOGY

## 2021-02-11 PROCEDURE — 88305 TISSUE EXAM BY PATHOLOGIST: CPT | Mod: TC | Performed by: SURGERY

## 2021-02-11 PROCEDURE — 11102 TANGNTL BX SKIN SINGLE LES: CPT

## 2021-02-11 PROCEDURE — 11044 DBRDMT BONE 1ST 20 SQ CM/<: CPT | Performed by: SURGERY

## 2021-02-11 PROCEDURE — 17250 CHEM CAUT OF GRANLTJ TISSUE: CPT | Performed by: SURGERY

## 2021-02-11 PROCEDURE — 11042 DBRDMT SUBQ TIS 1ST 20SQCM/<: CPT

## 2021-02-11 PROCEDURE — G0463 HOSPITAL OUTPT CLINIC VISIT: HCPCS | Mod: 25

## 2021-02-11 PROCEDURE — 99213 OFFICE O/P EST LOW 20 MIN: CPT | Mod: 25 | Performed by: SURGERY

## 2021-02-11 PROCEDURE — 11102 TANGNTL BX SKIN SINGLE LES: CPT | Mod: 59 | Performed by: SURGERY

## 2021-02-11 PROCEDURE — 11042 DBRDMT SUBQ TIS 1ST 20SQCM/<: CPT | Performed by: SURGERY

## 2021-02-11 RX ORDER — METHOCARBAMOL 500 MG/1
TABLET, FILM COATED ORAL
COMMUNITY
Start: 2020-05-27 | End: 2021-03-02

## 2021-02-11 RX ORDER — SILDENAFIL 50 MG/1
TABLET, FILM COATED ORAL
COMMUNITY
Start: 2020-05-27 | End: 2021-02-17

## 2021-02-11 RX ORDER — LEVOTHYROXINE SODIUM 112 UG/1
TABLET ORAL
COMMUNITY
Start: 2020-05-28 | End: 2021-02-17

## 2021-02-11 RX ORDER — TAMSULOSIN HYDROCHLORIDE 0.4 MG/1
CAPSULE ORAL
COMMUNITY
Start: 2020-05-27 | End: 2021-02-17

## 2021-02-11 NOTE — PROGRESS NOTES
[facility] [place] Progress Note    Subject: Braxton Mantilla recent traumatic left hip fracture, hospitalization is also for 2 days, subsequent transfer to [place], he now has a left heel pressure ulceration, states he also has an open draining wound from the proximal posterior calf which is been there approximately 5 years.  Served in the [employer].  Tobacco utilization.  No intent of cessation.  Medical record reviewed.    Patient Active Problem List   Diagnosis     Chronic atrial fibrillation (H)     Closed displaced intertrochanteric fracture of left femur, initial encounter (H)     Fall, initial encounter     Knee abrasion, left, initial encounter     Long term current use of anticoagulant therapy     Abdominal aortic aneurysm (AAA) (H)     Astigmatism     Borderline glaucoma     Cataract     Essential hypertension     Excessive sweating     Hereditary and idiopathic peripheral neuropathy     Hypothyroidism     Injury, other and unspecified, knee, leg, ankle, and foot     Other and unspecified hyperlipidemia     Palpitations     Personal history of tobacco use, presenting hazards to health     Presbyopia     Solitary pulmonary nodule     Special screening for other conditions     Tinea pedis     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     Type 2 diabetes mellitus (H)     Pressure injury of left heel, stage 3 (H)     Past Medical History:   Diagnosis Date     Benign essential hypertension      Hypothyroidism      Type 2 diabetes mellitus (H)      Exam:  /71   Pulse 92   Temp 97.6  F (36.4  C)   Wound (used by OP [facility] only) 02/05/21 1131 Left (Active)   Thickness/Stage Stage 3 02/11/21 0800   Dressing Appearance moist drainage 02/11/21 0800   Base necrotic 02/11/21 0800   Periwound macerated 02/11/21 0800   Periwound Temperature warm 02/11/21 0800   Periwound Skin Turgor soft 02/11/21 0800   Edges open 02/11/21 0800   Length (cm) 2.2 02/11/21 0800   Width (cm) 3 02/11/21 0800   Depth (cm) 0.4 02/11/21  0800   Wound (cm^2) 6.6 cm^2 02/11/21 0800   Wound Volume (cm^3) 2.64 cm^3 02/11/21 0800   Wound healing % 46.12 02/11/21 0800   Drainage Characteristics/Odor serosanguineous 02/11/21 0800   Drainage Amount moderate 02/11/21 0800   Care, Wound debrided 02/11/21 0800       Wound (used by OP [facility] only) 02/11/21 0930 Left unspecified (Active)   Thickness/Stage full thickness 02/11/21 0800   Base granulating 02/11/21 0800   Periwound intact 02/11/21 0800   Periwound Temperature warm 02/11/21 0800   Periwound Skin Turgor soft 02/11/21 0800   Edges open 02/11/21 0800   Length (cm) 1 02/11/21 0800   Width (cm) 1 02/11/21 0800   Depth (cm) 0.1 02/11/21 0800   Wound (cm^2) 1 cm^2 02/11/21 0800   Wound Volume (cm^3) 0.1 cm^3 02/11/21 0800   Drainage Amount moderate 02/11/21 0800   Care, Wound debrided 02/11/21 0800     See photodocumentation, thick eschar left heel, palpable left dorsalis pedis pulse.  Arterial studies reviewed from February 5, 2021, no evidence of peripheral arterial disease.  Triphasic distal waveforms bilaterally.  The 5-year chronic wound left proximal calf could be a marginal 1 ulcer, requires biopsy.  No palpable bone tendon or undermining at the left heel ulceration, low clinical suspicion currently for osteomyelitis    Procedure:   Patient was determined to be capable of making their own medical decisions and informed consent was obtained.  Placed in prone position.  Topical anesthetic of 4% lidocaine was applied, debridement was performed using a #15 blade down to and including subcutaneous tissue thick necrotic eschar at the heel, biofilm.  1% lidocaine was infiltrated into the wound at the proximal left calf, shave biopsy was then completed and sent to dermatopathology.  Bleeding controlled with light pressure and application of silver nitrate. Patient tolerated procedure well.    Impression: Chronic left proximal calf wound, excisional biopsy sent, history of left hip fracture with subsequent  development of pressure ulceration left heel, status post excisional debridement    Plan: We will dress the wounds with moist moist hypochlorous acid, elevate legs when sitting, Rooke boot.  Adjunctive micronutrients.  Would benefit from a total contact cast, will arrange for performance.  Timing would to be such that we need to be change again with him 48-72 hours after initial change.  Tobacco cessation recommended..  Patient will return to the clinic in 1 weeks time    Skip Mauricio MD on 2/11/2021 at 2:06 PM

## 2021-02-11 NOTE — PROGRESS NOTES
[place] [facility]  [place] Medical Record Number:  [id number]  Place of Service where encounter took place:  [facility] [[id number]]  Chief Complaint   Patient presents with     RECHECK       HPI:    Braxton Mantilla  is a 75 year old (1945), who is being seen today for an episodic care visit.  HPI information obtained from: facility chart records, facility staff, patient report and [facility] chart review.     Per chart review:  Here is a 75 year old male with hx of HTN, atrial fibrillation and DM II. He was hospitalized after a fall and was found to have a left intertrochanteric femur fracture. He is s/p cephalomedullary nailing on(12/20/20. Surgery without complication. Developed acute blood loss anemia, 11.6 at discharge. Pain control adequate.     Today's concern is:  PU on heel of L foot    Past Medical and Surgical History reviewed in Epic today.    MEDICATIONS:    Current Outpatient Medications   Medication Sig Dispense Refill     acetaminophen (TYLENOL) 500 MG tablet Take 1,000 mg by mouth 3 times daily as needed for pain        acetaminophen 500 MG CAPS TAKE TWO TABLETS BY MOUTH THREE TIMES A DAY AS NEEDED FOR BACK PAIN -NOT TO EXCEED 4000MG IN 24 HOURS FROM ALL SOURCES       cyanocobalamin (VITAMIN B-12) 1000 MCG tablet Take 1 tablet (1,000 mcg) by mouth daily 90 tablet 3     folic acid (FOLVITE) 400 MCG tablet Take 2 tablets (800 mcg) by mouth daily 90 tablet 3     gabapentin (NEURONTIN) 100 MG capsule Take 1 capsule (100 mg) by mouth At Bedtime (Patient taking differently: Take 100 mg by mouth 3 times daily )       levothyroxine (SYNTHROID/LEVOTHROID) 112 MCG tablet TAKE ONE TABLET BY MOUTH EVERY DAY FOR THYROID -TAKE ON AN EMPTY STOMACH       levothyroxine (SYNTHROID/LEVOTHROID) 112 MCG tablet Take 112 mcg by mouth daily       methocarbamol (ROBAXIN) 500 MG tablet TAKE ONE TABLET BY MOUTH TWICE A DAY AS NEEDED FOR BACK SPASM       metoprolol succinate ER  (TOPROL-XL) 25 MG 24 hr tablet Take 25 mg by mouth daily        oxyCODONE (ROXICODONE) 5 MG tablet Take 1-2 tablets (5-10 mg) by mouth every 4 hours as needed for severe pain 30 tablet 0     polyethylene glycol (MIRALAX) 17 g packet Take 1 packet by mouth 2 times daily        rivaroxaban ANTICOAGULANT (XARELTO) 20 MG TABS tablet Take 20 mg by mouth daily (with dinner)       sildenafil (VIAGRA) 100 MG tablet Take 100 mg by mouth daily as needed On hold at [facility]       sildenafil (VIAGRA) 50 MG tablet TAKE ONE-HALF TO ONE TABLET BY MOUTH AS NEEDED FOR ERECTIONS -TAKE 1 HOUR BEFORE ANTICIPATED SEXUAL ACTIVITY--MAXIMUM 4 DOSES FOR 30-DAY SUPPLY       simvastatin (ZOCOR) 80 MG tablet Take by mouth At Bedtime       tamsulosin (FLOMAX) 0.4 MG capsule TAKE ONE CAPSULE BY MOUTH EVERY DAY FOR PROSTATE       vitamin C (ASCORBIC ACID) 1000 MG TABS Take 1 tablet (1,000 mg) by mouth 2 times daily 90 tablet 3     vitamin D3 (CHOLECALCIFEROL) 250 mcg (46554 units) capsule Take 1 capsule (250 mcg) by mouth daily 90 capsule 1       REVIEW OF SYSTEMS:  4 point ROS including Respiratory, CV, GI and , other than that noted in the HPI,  is negative    Objective:  /76   Pulse 83   Temp 97.1  F (36.2  C)   Resp 16   Wt 98.2 kg (216 lb 9.6 oz)   SpO2 98%   Exam:  GENERAL APPEARANCE:  Alert, cooperative, denies pain  CV:  Palpation and auscultation of heart done , irregular per A-fib, no murmur, rub, or gallop, no edema  SKIN:  stage 3 on L foot on lateral aspect of heel, podiatry following  NEURO:   Cranial nerves 2-12 are normal tested and grossly at patient's baseline    Labs:   Recent labs in [facility] reviewed by me today.     ASSESSMENT/PLAN:    L heel PU, stage 2, infection present  Culture sent, MRSA/streptococcus, given clindamycin 300mg QID x10d (completed on 2/11), drsg changes ordered and floating heels, following with Dr Mauricio  MARÍA showed:  1. Right MARÍA shows noncompressible vessels. Normal right  digital-brachial index.  Waveforms are triphasic.  2. Left MARÍA is normal in the dorsalis pedis and noncompressible in the  posterior tibial.     Hereditary and idiopathic peripheral neuropathy  Continue gabapentin 100mg TID, effective     Pain control  Continue tylenol PRN and oxycodone 1-2 tabs q4h PRN, denies pain    Closed displaced intertrochanteric fracture of left femur, initial encounter (H)  WBAT, f/u with ortho as needed. No drsg, incision intact    Chronic atrial fibrillation (H)  Continue xarelto 20mg daily    Electronically signed by:  Jed Oconnor NP

## 2021-02-11 NOTE — DISCHARGE INSTRUCTIONS
[facility]  [address] [facility] [address] [address]    Call us at [phone] if you have any questions about your wounds, have redness or swelling around your wound, have a fever of 101 or greater or if you have any other problems or concerns. We answer the phone Monday through Friday 8 am to 4 pm, please leave a message as we check the voicemail frequently throughout the day.     Braxton Mantilla      1945    Medications/supplements to aid in healin. 1 packet of Coleman Supplement into your favorite beverage twice a day. Purchase at [facility] in [place] [address], [facility] or [facility]  2. Vitamin D3 10,000 iu per day  3. Vitamin C 2,000 mg daily  4. Methyl Folate 1000 mcg daily   5. Vitamin B 12 1000 mcg daily    A diet high in protein is important for wound healing, we recommend getting 90 grams of protein per day. Taking protein shakes or bars are a good way to get extra protein in your diet.  Other good sources of protein:  Pork 26g per 3 oz  Whey protein powder - 24g per scoop (on average)  Greek yogurt - 23g per 8oz   Chicken or Turkey - 23g per 3oz  Fish - 20-25g per 3oz  Beef - 18-23g per 3oz  Navy beans - 20g per cup  Cottage cheese - 14g per 1/2 cup   Lentils - 13g per 1/4 cup  Beef jerky 13g per 1oz  2% milk - 8g per cup  Peanut butter - 8g per 2 tablespoons  Eggs - 6g per egg  Mixed nuts - 6g per 2oz     [place] Fax:[phone]  Wound Dressing Change:Left Foot and Knee  Cleanse wound with wound cleanser  Apply vashe moist gauze to heel and knee cover with foam dressing and roll gauze if needed  Change dressing twice a day  Rooke boot on at all times.Toe Touch Weight bearing with gini Mauricio M.D.. 2021    Wound Clinic follow up for total contact casting     COVID vaccine information at [email or web address]    If you had a positive experience please indicate that on your patient satisfaction survey form  that [facility] will be sending you.

## 2021-02-12 ENCOUNTER — NURSING HOME VISIT (OUTPATIENT)
Dept: GERIATRICS | Facility: CLINIC | Age: 76
End: 2021-02-12

## 2021-02-12 DIAGNOSIS — G60.9 HEREDITARY AND IDIOPATHIC PERIPHERAL NEUROPATHY: Primary | ICD-10-CM

## 2021-02-12 DIAGNOSIS — I48.20 CHRONIC ATRIAL FIBRILLATION (H): ICD-10-CM

## 2021-02-12 DIAGNOSIS — I10 ESSENTIAL HYPERTENSION: ICD-10-CM

## 2021-02-12 DIAGNOSIS — S72.142A CLOSED DISPLACED INTERTROCHANTERIC FRACTURE OF LEFT FEMUR, INITIAL ENCOUNTER (H): ICD-10-CM

## 2021-02-12 DIAGNOSIS — L89.623 PRESSURE INJURY OF LEFT HEEL, STAGE 3 (H): ICD-10-CM

## 2021-02-12 PROCEDURE — 99309 SBSQ NF CARE MODERATE MDM 30: CPT | Performed by: NURSE PRACTITIONER

## 2021-02-15 LAB — COPATH REPORT: NORMAL

## 2021-02-16 ENCOUNTER — HOSPITAL LABORATORY (OUTPATIENT)
Dept: OTHER | Facility: CLINIC | Age: 76
End: 2021-02-16

## 2021-02-16 VITALS
WEIGHT: 215 LBS | HEART RATE: 84 BPM | RESPIRATION RATE: 18 BRPM | OXYGEN SATURATION: 95 % | SYSTOLIC BLOOD PRESSURE: 106 MMHG | DIASTOLIC BLOOD PRESSURE: 64 MMHG | TEMPERATURE: 97.7 F

## 2021-02-16 LAB
SARS-COV-2 RNA RESP QL NAA+PROBE: NORMAL
SPECIMEN SOURCE: NORMAL

## 2021-02-17 ENCOUNTER — NURSING HOME VISIT (OUTPATIENT)
Dept: GERIATRICS | Facility: CLINIC | Age: 76
End: 2021-02-17

## 2021-02-17 DIAGNOSIS — R53.81 PHYSICAL DECONDITIONING: ICD-10-CM

## 2021-02-17 DIAGNOSIS — E03.9 HYPOTHYROIDISM, UNSPECIFIED TYPE: ICD-10-CM

## 2021-02-17 DIAGNOSIS — D62 ANEMIA DUE TO BLOOD LOSS, ACUTE: ICD-10-CM

## 2021-02-17 DIAGNOSIS — L89.626 PRESSURE INJURY OF DEEP TISSUE OF LEFT HEEL: ICD-10-CM

## 2021-02-17 DIAGNOSIS — I48.20 CHRONIC ATRIAL FIBRILLATION (H): ICD-10-CM

## 2021-02-17 DIAGNOSIS — E78.5 HYPERLIPIDEMIA, UNSPECIFIED HYPERLIPIDEMIA TYPE: ICD-10-CM

## 2021-02-17 DIAGNOSIS — K59.01 SLOW TRANSIT CONSTIPATION: ICD-10-CM

## 2021-02-17 DIAGNOSIS — I10 ESSENTIAL HYPERTENSION: ICD-10-CM
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
## 2021-02-17 DIAGNOSIS — S72.142A CLOSED DISPLACED INTERTROCHANTERIC FRACTURE OF LEFT FEMUR, INITIAL ENCOUNTER (H): Primary | ICD-10-CM

## 2021-02-17 DIAGNOSIS — E11.9 TYPE 2 DIABETES MELLITUS WITHOUT COMPLICATION, WITHOUT LONG-TERM CURRENT USE OF INSULIN (H): ICD-10-CM

## 2021-02-17 PROCEDURE — 99309 SBSQ NF CARE MODERATE MDM 30: CPT | Performed by: NURSE PRACTITIONER

## 2021-02-17 RX ORDER — UREA 8.5 G/85G
CREAM TOPICAL AT BEDTIME
COMMUNITY

## 2021-02-17 RX ORDER — OXYCODONE HYDROCHLORIDE 5 MG/1
5 TABLET ORAL 3 TIMES DAILY PRN
Qty: 30 TABLET | Refills: 0 | Status: SHIPPED | OUTPATIENT
Start: 2021-02-17 | End: 2021-02-22

## 2021-02-17 NOTE — PROGRESS NOTES
[place] [facility]  [place] Medical Record Number:  [id number]  Place of Service where encounter took place:  [facility] [[id number]]  Chief Complaint   Patient presents with     RECHECK       HPI:    Braxton Mantilla  is a 75 year old (1945), who is being seen today for an episodic care visit.  HPI information obtained from: facility chart records, facility staff, patient report and [facility] chart review.     Per recent TCU provider progress notes:  75 year old male with hx of HTN, atrial fibrillation and DM II. He was hospitalized after a fall and was found to have a left intertrochanteric femur fracture. He is s/p cephalomedullary nail (12/20/20). Surgery without complication. Developed acute blood loss anemia (Hgb 13.6 --> 11.9) He is new on gabapentin. Increased senna due to constipation. Developed left heel DTI: foam dressing, TTWB left foot and treated with antibiotics, now seeing wound specialist.     Per 2/11 dr Mauricio note:  --dress the wounds with moist moist hypochlorous acid, elevate legs when sitting, Rooke boot.  Adjunctive micronutrients. Would benefit from a total contact cast, will arrange for performance.  Timing would to be such that we need to be change again with him 48-72 hours after initial change.    Today's concern is:  Patient seen for episodic TCU visit.  Denies headaches, chest pain, dyspnea, bladder or bowel issues. Dizziness less frequent with decrease in BP meds. Regular bowel movements off Miralax. Pain minimal - takes 10 mg oxycodone daily on average, discussed starting to cut back. Per EMR review no fevers, BP range /58-77 and sats 95% on room air. Per therapies walks 100 ft with SBA and 2WW. SLUMS 27/30.     Past Medical and Surgical History reviewed in Epic today.    MEDICATIONS  Current Outpatient Medications   Medication Sig Dispense Refill     acetaminophen (TYLENOL) 500 MG tablet Take 1,000 mg by mouth 3 times daily as needed for  pain        cyanocobalamin (VITAMIN B-12) 1000 MCG tablet Take 1 tablet (1,000 mcg) by mouth daily 90 tablet 3     folic acid (FOLVITE) 400 MCG tablet Take 2 tablets (800 mcg) by mouth daily 90 tablet 3     gabapentin (NEURONTIN) 100 MG capsule Take 1 capsule (100 mg) by mouth At Bedtime (Patient taking differently: Take 100 mg by mouth 3 times daily )       levothyroxine (SYNTHROID/LEVOTHROID) 112 MCG tablet Take 112 mcg by mouth daily       metoprolol succinate ER (TOPROL-XL) 25 MG 24 hr tablet Take 25 mg by mouth daily        oxyCODONE (ROXICODONE) 5 MG tablet Take 1 tablet (5 mg) by mouth 3 times daily as needed for pain or severe pain 30 tablet 0     rivaroxaban ANTICOAGULANT (XARELTO) 20 MG TABS tablet Take 20 mg by mouth daily (with dinner)       simvastatin (ZOCOR) 80 MG tablet Take by mouth At Bedtime       urea (CARMOL) 10 % external cream Apply topically At Bedtime To feet       vitamin C (ASCORBIC ACID) 1000 MG TABS Take 1 tablet (1,000 mg) by mouth 2 times daily 90 tablet 3     vitamin D3 (CHOLECALCIFEROL) 250 mcg (72667 units) capsule Take 1 capsule (250 mcg) by mouth daily 90 capsule 1     methocarbamol (ROBAXIN) 500 MG tablet TAKE ONE TABLET BY MOUTH TWICE A DAY AS NEEDED FOR BACK SPASM       sildenafil (VIAGRA) 100 MG tablet Take 100 mg by mouth daily as needed On hold at TCU       REVIEW OF SYSTEMS:  4 point ROS including Respiratory, CV, GI and , other than that noted in the HPI,  is negative    Objective:  /64   Pulse 84   Temp 97.7  F (36.5  C)   Resp 18   Wt 97.5 kg (215 lb)   SpO2 95%   Exam:  Exam is limited due to COVID-19 precautions  GENERAL APPEARANCE:  Alert, in no distress, cooperative  ENT:  Mouth normal, moist mucous membranes, normal hearing acuity  EYES:  Conjunctiva and lids normal  RESP:  no respiratory distress, on room air  CV: no LE edema  SKIN: left heel wound dressed, wearing Rooke boot - exam deferred   NEURO:   No facial asymmetry, speech clear  PSYCH:   oriented X 3, affect and mood normal     Labs:   Most Recent 3 CBC's:  Recent Labs   Lab Test 02/02/21  0713 12/29/20  0920 12/22/20  0902 12/20/20  1041 12/20/20  1041   WBC 7.8  --  8.0  --  8.3   HGB 12.8* 13.1* 11.8*   < > 12.8*     --  107*  --  109*     --  184  --  190    < > = values in this interval not displayed.     Most Recent 3 BMP's:  Recent Labs   Lab Test 02/02/21  0713 12/22/20  0902 12/21/20  0703 12/20/20  1041    140  --  139   POTASSIUM 3.5 4.6  --  4.3   CHLORIDE 103 108  --  106   CO2 32 30  --  28   BUN 20 6*  --  11   CR 0.80 0.68  --  0.93   ANIONGAP 4 2*  --  5   FADI 9.1 8.5  --  8.6   * 103* 96 87       ASSESSMENT/PLAN:  Left Intertrochanteric Femur Fracture s/p IM Nail (12/20/20)  Acute onset with injury, surgery. Pain improved: takes Tylenol PRN, gabapentin, oxycodone PRN - will decrease dose to 5 mg TID as needed. Continue DVT ppx with rivaroxaban (chronic med). Monitor, ortho f/u as recommended.      Acute Blood Loss Anemia  Acute. Hgb improved. Monitor PRN.     HTN  Chronic, BPs improved since stopped Norvasc and lisinopril. Decreased metoprolol XL to 25 mg daily. No changes and review VS next visit.      Chronic Atrial Fibrillation  Chronic, stable. Continue metoprolol XL 25 mg daily and rivaroxaban 20 mg daily. F/U next visit.      DM, Type II  Chronic and diet controlled. No routine BG checks.      HLD  Chronic, continue PTA simvastatin 80 mg at bedtime.      Hypothyroidism  Chronic, continue synthroid as PTA and TSH monitoring per PCP.      Slow Transit Constipation  Resolved, monitor. Staff to update provider if any further issues.      Fall  Physical Deconditioning  Acute, slowly improving. Therapies - f/u with progress next visit.     Deep tissue injury left heel  Acute, now seeing Dr [facility]. New wound care orders as noted above and f/u with wound clinic as scheduled.     Orders written by provider at facility:  1. Decrease oxycodone to 5  mg PO TID PRN pain    Electronically signed by:  Merry Lira, CECILIA CNP

## 2021-02-22 ENCOUNTER — TELEPHONE (OUTPATIENT)
Dept: WOUND CARE | Facility: CLINIC | Age: 76
End: 2021-02-22

## 2021-02-22 DIAGNOSIS — S72.142A CLOSED DISPLACED INTERTROCHANTERIC FRACTURE OF LEFT FEMUR, INITIAL ENCOUNTER (H): ICD-10-CM

## 2021-02-22 RX ORDER — OXYCODONE HYDROCHLORIDE 5 MG/1
5 TABLET ORAL 3 TIMES DAILY PRN
Qty: 30 TABLET | Refills: 0 | Status: SHIPPED | OUTPATIENT
Start: 2021-02-22 | End: 2021-03-02

## 2021-02-22 NOTE — TELEPHONE ENCOUNTER
Photo emailed to [email or web address] from Renate nair RN to send info via email.

## 2021-02-22 NOTE — TELEPHONE ENCOUNTER
Ashley from [facility] called because they received the referral but need colored photos of the biopsy emailed to them at [email or web address].  You can call Ashley at Medical Records  option 5.

## 2021-02-23 ENCOUNTER — TELEPHONE (OUTPATIENT)
Dept: WOUND CARE | Facility: CLINIC | Age: 76
End: 2021-02-23

## 2021-02-23 ENCOUNTER — HOSPITAL LABORATORY (OUTPATIENT)
Dept: OTHER | Facility: CLINIC | Age: 76
End: 2021-02-23

## 2021-02-23 LAB
SARS-COV-2 RNA RESP QL NAA+PROBE: NORMAL
SPECIMEN SOURCE: NORMAL

## 2021-02-23 NOTE — TELEPHONE ENCOUNTER
Received call back via voicemail. Left another message with Dermatology specialist., Upon returned call they informed us [facility]. Will address with Patient at next appointment.

## 2021-02-23 NOTE — TELEPHONE ENCOUNTER
Miriam from [facility] called to say that Braxton has decided not to go ahead with surgery with Dr. Lynch.

## 2021-02-23 NOTE — TELEPHONE ENCOUNTER
Returned call to Dermatology specialist with Dr. Donis office to enquire if patient had appointment with Dr. Donis and refused MOHS or if he never went to appointment. We have follow up with patient 3/2/21 to monitor wound healing.

## 2021-02-25 VITALS
RESPIRATION RATE: 16 BRPM | HEART RATE: 80 BPM | SYSTOLIC BLOOD PRESSURE: 105 MMHG | DIASTOLIC BLOOD PRESSURE: 72 MMHG | TEMPERATURE: 98.6 F | WEIGHT: 214.9 LBS | OXYGEN SATURATION: 97 %

## 2021-02-26 ENCOUNTER — NURSING HOME VISIT (OUTPATIENT)
Dept: GERIATRICS | Facility: CLINIC | Age: 76
End: 2021-02-26

## 2021-02-26 DIAGNOSIS — S72.142A CLOSED DISPLACED INTERTROCHANTERIC FRACTURE OF LEFT FEMUR, INITIAL ENCOUNTER (H): ICD-10-CM

## 2021-02-26 DIAGNOSIS — I48.20 CHRONIC ATRIAL FIBRILLATION (H): ICD-10-CM

## 2021-02-26 DIAGNOSIS — G60.9 HEREDITARY AND IDIOPATHIC PERIPHERAL NEUROPATHY: Primary | ICD-10-CM

## 2021-02-26 DIAGNOSIS — I10 ESSENTIAL HYPERTENSION: ICD-10-CM

## 2021-02-26 PROCEDURE — 99309 SBSQ NF CARE MODERATE MDM 30: CPT | Performed by: NURSE PRACTITIONER

## 2021-02-26 NOTE — PROGRESS NOTES
[facility]  [place] Medical Record Number:  [id number]  Place of Service where encounter took place:  [facility]) [[id number]]  Chief Complaint   Patient presents with     RECHECK        HPI:    Braxton Mantilla  is a 75 year old (1945), who is being seen today for an episodic care visit.  HPI information obtained from: facility chart records, facility staff, patient report and [facility] chart review.     Per chart review:  Here is a 75 year old male with hx of HTN, atrial fibrillation and DM II. He was hospitalized after a fall and was found to have a left intertrochanteric femur fracture. He is s/p cephalomedullary nailing on(12/20/20. Surgery without complication. Developed acute blood loss anemia, 11.6 at discharge. Pain control adequate.    Today's concern is:  PU on L heel    Past Medical and Surgical History reviewed in Epic today.    MEDICATIONS:    Current Outpatient Medications   Medication Sig Dispense Refill     acetaminophen (TYLENOL) 500 MG tablet Take 1,000 mg by mouth 3 times daily as needed for pain        cyanocobalamin (VITAMIN B-12) 1000 MCG tablet Take 1 tablet (1,000 mcg) by mouth daily 90 tablet 3     folic acid (FOLVITE) 400 MCG tablet Take 2 tablets (800 mcg) by mouth daily 90 tablet 3     gabapentin (NEURONTIN) 100 MG capsule Take 1 capsule (100 mg) by mouth At Bedtime (Patient taking differently: Take 100 mg by mouth 3 times daily )       levothyroxine (SYNTHROID/LEVOTHROID) 112 MCG tablet Take 112 mcg by mouth daily       methocarbamol (ROBAXIN) 500 MG tablet TAKE ONE TABLET BY MOUTH TWICE A DAY AS NEEDED FOR BACK SPASM       metoprolol succinate ER (TOPROL-XL) 25 MG 24 hr tablet Take 25 mg by mouth daily        oxyCODONE (ROXICODONE) 5 MG tablet Take 1 tablet (5 mg) by mouth 3 times daily as needed for pain or severe pain 30 tablet 0     rivaroxaban ANTICOAGULANT (XARELTO) 20 MG TABS tablet Take 20 mg by mouth daily (with dinner)        sildenafil (VIAGRA) 100 MG tablet Take 100 mg by mouth daily as needed On hold at [facility]       simvastatin (ZOCOR) 80 MG tablet Take by mouth At Bedtime       urea (CARMOL) 10 % external cream Apply topically At Bedtime To feet       vitamin C (ASCORBIC ACID) 1000 MG TABS Take 1 tablet (1,000 mg) by mouth 2 times daily 90 tablet 3     vitamin D3 (CHOLECALCIFEROL) 250 mcg (14286 units) capsule Take 1 capsule (250 mcg) by mouth daily 90 capsule 1       REVIEW OF SYSTEMS:  4 point ROS including Respiratory, CV, GI and , other than that noted in the HPI,  is negative    Objective:  /72   Pulse 80   Temp 98.6  F (37  C)   Resp 16   Wt 97.5 kg (214 lb 14.4 oz)   SpO2 97%   Exam:  GENERAL APPEARANCE:  Alert, cooperative, denies pain  CV:  Palpation and auscultation of heart done , irregular per A-fib, no murmur, rub, or gallop, no edema  SKIN:  stage 3 on L foot on lateral aspect of heel, podiatry following. Rooke boot  NEURO:   Cranial nerves 2-12 are normal tested and grossly at patient's baseline    Labs:   Recent labs in EPIC reviewed by me today.     ASSESSMENT/PLAN:  L heel PU, stage 2, infection present  Culture sent, MRSA/streptococcus, given clindamycin 300mg QID x10d (completed on 2/11), drsg changes ordered BID and floating heels, wearing Rooke boot, following with Dr Mauricio  MARÍA showed:  1. Right MARÍA shows noncompressible vessels. Normal right  digital-brachial index. Waveforms are triphasic.  2. Left MARÍA is normal in the dorsalis pedis and noncompressible in the  posterior tibial.  F/u on 3/3     Hereditary and idiopathic peripheral neuropathy  Continue gabapentin 100mg TID, effective     Pain control  Continue tylenol PRN and oxycodone weaned to 5mg TID PRN, denies pain     Closed displaced intertrochanteric fracture of left femur, initial encounter (H)  TTWB, f/u with ortho as needed. No drsg, incision intact    Mohs procedure L popliteal  [facility]     Chronic atrial fibrillation  (H)  Continue xarelto 20mg daily    Electronically signed by:  Jed Oconnor NP

## 2021-03-01 VITALS
SYSTOLIC BLOOD PRESSURE: 101 MMHG | RESPIRATION RATE: 18 BRPM | OXYGEN SATURATION: 97 % | TEMPERATURE: 98.6 F | DIASTOLIC BLOOD PRESSURE: 65 MMHG | HEART RATE: 68 BPM | WEIGHT: 214.9 LBS

## 2021-03-02 ENCOUNTER — HOSPITAL LABORATORY (OUTPATIENT)
Dept: OTHER | Facility: CLINIC | Age: 76
End: 2021-03-02

## 2021-03-02 ENCOUNTER — DISCHARGE SUMMARY NURSING HOME (OUTPATIENT)
Dept: GERIATRICS | Facility: CLINIC | Age: 76
End: 2021-03-02

## 2021-03-02 ENCOUNTER — HOSPITAL ENCOUNTER (OUTPATIENT)
Dept: WOUND CARE | Facility: CLINIC | Age: 76
Discharge: HOME | End: 2021-03-02
Attending: SURGERY | Admitting: SURGERY

## 2021-03-02 VITALS
HEART RATE: 114 BPM | SYSTOLIC BLOOD PRESSURE: 102 MMHG | RESPIRATION RATE: 18 BRPM | DIASTOLIC BLOOD PRESSURE: 70 MMHG | TEMPERATURE: 97.4 F

## 2021-03-02 DIAGNOSIS — I10 ESSENTIAL HYPERTENSION: ICD-10-CM
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
## 2021-03-02 DIAGNOSIS — L89.623 PRESSURE INJURY OF LEFT HEEL, STAGE 3 (H): ICD-10-CM

## 2021-03-02 DIAGNOSIS — E03.9 HYPOTHYROIDISM, UNSPECIFIED TYPE: ICD-10-CM

## 2021-03-02 DIAGNOSIS — D62 ANEMIA DUE TO BLOOD LOSS, ACUTE: ICD-10-CM

## 2021-03-02 DIAGNOSIS — E11.9 TYPE 2 DIABETES MELLITUS WITHOUT COMPLICATION, WITHOUT LONG-TERM CURRENT USE OF INSULIN (H): ICD-10-CM

## 2021-03-02 DIAGNOSIS — R53.81 PHYSICAL DECONDITIONING: ICD-10-CM

## 2021-03-02 DIAGNOSIS — K59.01 SLOW TRANSIT CONSTIPATION: ICD-10-CM

## 2021-03-02 DIAGNOSIS — S72.142A CLOSED DISPLACED INTERTROCHANTERIC FRACTURE OF LEFT FEMUR, INITIAL ENCOUNTER (H): ICD-10-CM

## 2021-03-02 DIAGNOSIS — G47.00 INSOMNIA, UNSPECIFIED TYPE: Primary | ICD-10-CM

## 2021-03-02 DIAGNOSIS — S72.142A CLOSED DISPLACED INTERTROCHANTERIC FRACTURE OF LEFT FEMUR, INITIAL ENCOUNTER (H): Primary | ICD-10-CM

## 2021-03-02 DIAGNOSIS — I48.20 CHRONIC ATRIAL FIBRILLATION (H): ICD-10-CM

## 2021-03-02 DIAGNOSIS — E78.5 HYPERLIPIDEMIA, UNSPECIFIED HYPERLIPIDEMIA TYPE: ICD-10-CM

## 2021-03-02 LAB
SARS-COV-2 RNA RESP QL NAA+PROBE: NORMAL
SPECIMEN SOURCE: NORMAL

## 2021-03-02 PROCEDURE — 11042 DBRDMT SUBQ TIS 1ST 20SQCM/<: CPT | Performed by: SURGERY

## 2021-03-02 PROCEDURE — 99316 NF DSCHRG MGMT 30 MIN+: CPT | Performed by: NURSE PRACTITIONER

## 2021-03-02 PROCEDURE — 29445 APPL RIGID TOT CNTC LEG CAST: CPT

## 2021-03-02 PROCEDURE — 11042 DBRDMT SUBQ TIS 1ST 20SQCM/<: CPT

## 2021-03-02 RX ORDER — TRAZODONE HYDROCHLORIDE 50 MG/1
25 TABLET, FILM COATED ORAL AT BEDTIME
COMMUNITY
End: 2021-03-02

## 2021-03-02 RX ORDER — TRAZODONE HYDROCHLORIDE 50 MG/1
25 TABLET, FILM COATED ORAL AT BEDTIME
Qty: 15 TABLET | Refills: 0 | Status: SHIPPED | OUTPATIENT
Start: 2021-03-02

## 2021-03-02 RX ORDER — GABAPENTIN 100 MG/1
100 CAPSULE ORAL 3 TIMES DAILY
Qty: 90 CAPSULE | Refills: 0 | Status: SHIPPED | OUTPATIENT
Start: 2021-03-02

## 2021-03-02 RX ORDER — OXYCODONE HYDROCHLORIDE 5 MG/1
5 TABLET ORAL 3 TIMES DAILY PRN
Qty: 15 TABLET | Refills: 0 | Status: SHIPPED | OUTPATIENT
Start: 2021-03-02 | End: 2021-06-07

## 2021-03-02 NOTE — PROGRESS NOTES
[facility] DISCHARGE SUMMARY  PATIENT'S NAME: Braxton Mantilla  YOB: 1945  MEDICAL RECORD NUMBER:  [id number]  Place of Service where encounter took place:  [facility] [[id number]]    PRIMARY CARE PROVIDER AND CLINIC RESPONSIBLE AFTER TRANSFER:   Physician No Ref-Primary, No address on file    Non-[facility] Provider     Transferring providers: CECILIA Brand CNP, Dr. Malathi MD  Recent Hospitalization/ED:  [facility] Hospital stay 12/19/20 to 12/22/20.  Date of SNF Admission: December/22/2020  Date of SNF (anticipated) Discharge: March/04/2021  Discharged to: previous independent home  Cognitive Scores: SLUMS: 27/30  Physical Function: Ambulating 100 ft with 2ww  DME: Walker and single point cane, bath bench    CODE STATUS/ADVANCE DIRECTIVES DISCUSSION:  DNR / DNI   ALLERGIES: Patient has no known allergies.     Per recent TCU provider progress notes:  75 year old male with hx of HTN, atrial fibrillation and DM II. He was hospitalized after a fall and was found to have a left intertrochanteric femur fracture. He is s/p cephalomedullary nail (12/20/20). Surgery without complication. Developed acute blood loss anemia (Hgb 13.6 --> 11.9) He is new on gabapentin. Increased senna due to constipation. Developed left heel DTI: now seeing wound specialist and is s/p debridement, plan for casting.     Today no new concerns. Per EMR review no fevers, BP range 101-116/65-71 and sats 96% on room air. Per therapies walks 100 ft with SBA and 2WW. SLUMS 27/30.     DISCHARGE DIAGNOSIS/NURSING FACILITY COURSE:   Left Intertrochanteric Femur Fracture s/p IM Nail (12/20/20)  Acute onset with injury, surgery. Pain improved: takes Tylenol PRN, gabapentin, oxycodone PRN - decreased dose to 5 mg TID as needed. Continue DVT ppx with rivaroxaban (chronic med). Monitor, ortho f/u as recommended.      Acute Blood Loss Anemia  Acute. Hgb improved. Monitor PRN.      HTN  Chronic, BPs improved since stopped Norvasc and lisinopril. Decreased metoprolol XL to 25 mg daily. No changes and review VS next visit with PCP.      Chronic Atrial Fibrillation  Chronic, stable. Continue metoprolol XL 25 mg daily and rivaroxaban 20 mg daily.      DM, Type II  Chronic and diet controlled. No routine BG checks.      HLD  Chronic, continue PTA simvastatin 80 mg at bedtime.      Hypothyroidism  Chronic, continue synthroid as PTA and TSH monitoring per PCP.      Slow Transit Constipation  Resolved, monitor. Staff to update provider if any further issues.      Fall  Physical Deconditioning  Acute, slowly improving. Ready to discharge home this week with home care.     Deep tissue injury left heel  Acute, now seeing Dr [facility]. New wound care orders per Dr Mauricio and f/u with wound clinic as scheduled.     Past Medical History:  has a past medical history of Benign essential hypertension, Hypothyroidism, and Type 2 diabetes mellitus (H).    Discharge Medications:  Current Outpatient Medications   Medication Sig Dispense Refill     acetaminophen (TYLENOL) 500 MG tablet Take 1,000 mg by mouth 3 times daily as needed for pain        cyanocobalamin (VITAMIN B-12) 1000 MCG tablet Take 1 tablet (1,000 mcg) by mouth daily 90 tablet 3     folic acid (FOLVITE) 400 MCG tablet Take 2 tablets (800 mcg) by mouth daily 90 tablet 3     levothyroxine (SYNTHROID/LEVOTHROID) 112 MCG tablet Take 112 mcg by mouth daily       metoprolol succinate ER (TOPROL-XL) 25 MG 24 hr tablet Take 25 mg by mouth daily        rivaroxaban ANTICOAGULANT (XARELTO) 20 MG TABS tablet Take 20 mg by mouth daily (with dinner)       sildenafil (VIAGRA) 100 MG tablet Take 100 mg by mouth daily as needed On hold at [facility]       simvastatin (ZOCOR) 80 MG tablet Take by mouth At Bedtime       urea (CARMOL) 10 % external cream Apply topically At Bedtime To feet       vitamin C (ASCORBIC ACID) 1000 MG TABS Take 1 tablet (1,000 mg) by  mouth 2 times daily 90 tablet 3     vitamin D3 (CHOLECALCIFEROL) 250 mcg (93885 units) capsule Take 1 capsule (250 mcg) by mouth daily 90 capsule 1     gabapentin (NEURONTIN) 100 MG capsule Take 1 capsule (100 mg) by mouth 3 times daily 90 capsule 0     oxyCODONE (ROXICODONE) 5 MG tablet Take 1 tablet (5 mg) by mouth 3 times daily as needed for pain or severe pain 15 tablet 0     traZODone (DESYREL) 50 MG tablet Take 0.5 tablets (25 mg) by mouth At Bedtime 15 tablet 0       Medication Changes/Rationale:     Decreased oxycodone to 5 mg TID PRN pain    Added trazodone at HS due to insomnia    Wound clinic added: vit d, vit c, folate and vit b12 supplements    Stopped miralax    Increased gabapentin to 100 mg TID due to pain    Stopped Norvasc and lisinopril due to low BP    Decreased metoprolol dose due to low BP    Controlled medications sent with patient:   Oxycodone 5 mg tabs #15 tabs and [facility]     ROS:   4 point ROS including Respiratory, CV, GI and , other than that noted in the HPI,  is negative    Physical Exam:   Vitals: /65   Pulse 68   Temp 98.6  F (37  C)   Resp 18   Wt 97.5 kg (214 lb 14.4 oz)   SpO2 97%   BMI= There is no height or weight on file to calculate BMI.  Exam is limited due to COVID-19 precautions  GENERAL APPEARANCE:  Alert, in no distress, cooperative  ENT:  Mouth normal, moist mucous membranes, normal hearing acuity  EYES:  Conjunctiva and lids normal  RESP:  no respiratory distress, on room air  MS: left lower leg in boot  NEURO:   No facial asymmetry, speech clear  PSYCH:  oriented X 3, affect and mood normal     SNF labs:   Most Recent 3 CBC's:  Recent Labs   Lab Test 02/02/21  0713 12/29/20  0920 12/22/20  0902 12/20/20  1041 12/20/20  1041   WBC 7.8  --  8.0  --  8.3   HGB 12.8* 13.1* 11.8*   < > 12.8*     --  107*  --  109*     --  184  --  190    < > = values in this interval not displayed.     Most Recent 3 BMP's:  Recent Labs   Lab  Test 02/02/21  0713 12/22/20  0902 12/21/20  0703 12/20/20  1041    140  --  139   POTASSIUM 3.5 4.6  --  4.3   CHLORIDE 103 108  --  106   CO2 32 30  --  28   BUN 20 6*  --  11   CR 0.80 0.68  --  0.93   ANIONGAP 4 2*  --  5   FADI 9.1 8.5  --  8.6   * 103* 96 87       DISCHARGE PLAN:    Follow up labs: No labs due    Medical Follow Up:      Follow up with primary care provider in 2-3 weeks  Follow up with specialist ortho as recommended   Follow up with consultant wound clinic as planned - wound care per Dr Mauricio 3/2/21 orders    MTM referral needed and placed by this provider: No    Current [place] scheduled appointments:  Next 5 appointments (look out 90 days)    Mar 02, 2021  9:40 AM  Return Visit with Skip Mauricio MD  [facility] ) [address]  [address] [place] [address]  [phone]   Mar 04, 2021  9:40 AM  Return Visit with Skip Mauricio MD  [facility] ) [address]  [address]  [facility] [address]  [phone]   Mar 11, 2021 12:40 PM  Return Visit with Skip Mauricio MD  [facility] ) [address]  [address]  [facility] [address]  [phone]   Mar 18, 2021 12:40 PM  Return Visit with Skip Mauricio MD  [facility] [place] [facility] ) [address]  [address]  [facility] [address]  [phone]   Mar 25, 2021 12:40 PM  Return Visit with Skip Mauricio MD  [facility] ) [address]  [address]  [facility] [address]  [phone]           Discharge Services: Home Care:  Occupational Therapy, Physical Therapy, Registered Nurse, Home Health Aide,  and From:  [facility]    Discharge Instructions Verbalized to Patient at Discharge:     Bath  chair and single point cane ordered at discharge provided facility with script    Wound care as ordered at 3/2 wound clinic follow up to left heel ulcer      TOTAL DISCHARGE TIME:   Greater than 30 minutes  Electronically signed by:  CECILIA Brand CNP     Home care Face to Face documentation done in [facility] attached to Home care orders for [facility].

## 2021-03-02 NOTE — DISCHARGE INSTRUCTIONS
[facility]  [address] [facility] [address] [address]    Call us at [phone] if you have any questions about your wounds, have redness or swelling around your wound, have a fever of 101 or greater or if you have any other problems or concerns. We answer the phone Monday through Friday 8 am to 4 pm, please leave a message as we check the voicemail frequently throughout the day.     Braxton Mantilla      1945    Medications/supplements to aid in healin. 1 packet of Coleman Supplement into your favorite beverage twice a day. Purchase at [facility] in [place] [address], [employer] or [employer]  2. Vitamin D3 10,000 iu per day  3. Vitamin C 2,000 mg daily  4. Methyl Folate 1000 mcg daily   5. Vitamin B 12 1000 mcg daily    A diet high in protein is important for wound healing, we recommend getting 90 grams of protein per day. Taking protein shakes or bars are a good way to get extra protein in your diet.  Other good sources of protein:  Pork 26g per 3 oz  Whey protein powder - 24g per scoop (on average)  Greek yogurt - 23g per 8oz   Chicken or Turkey - 23g per 3oz  Fish - 20-25g per 3oz  Beef - 18-23g per 3oz  Navy beans - 20g per cup  Cottage cheese - 14g per 1/2 cup   Lentils - 13g per 1/4 cup  Beef jerky 13g per 1oz  2% milk - 8g per cup  Peanut butter - 8g per 2 tablespoons  Eggs - 6g per egg  Mixed nuts - 6g per 2oz     Wound care recommendations to Left Heel   Cleanse with vashe protocol  Cover wound with Endoform antimicrobial cut to size slightly larger than wound, moisten with saline  (this is supposed to dissolve into wound, if it doesn't remove and replace)  Cover wound with hydrofera blue ready transfer, spandage and TCC Foam and Cast   Change dressing weekly on   You need to stay off your foot at all times to help heal your wound.  You should wear your offloading boot at all times.  Use Knee roller to keep your weight off your  foot.       MATT Mauricio M.D.. March 2, 2021    Wound Clinic follow up weekly    COVID vaccine information at [email or web address]    If you had a positive experience please indicate that on your patient satisfaction survey form that [facility] will be sending you.

## 2021-03-03 NOTE — PROGRESS NOTES
[facility] [place] Progress Note    Subject: Braxton Mantilla returns to initiate total contact cast left lower extremity, history of pressure ulceration status post repair of traumatic left hip fracture.  History of tobacco utilization adult-onset diabetes, has ceased tobacco utilization.    Patient Active Problem List   Diagnosis     Chronic atrial fibrillation (H)     Closed displaced intertrochanteric fracture of left femur, initial encounter (H)     Fall, initial encounter     Knee abrasion, left, initial encounter     Long term current use of anticoagulant therapy     Abdominal aortic aneurysm (AAA) (H)     Astigmatism     Borderline glaucoma     Cataract     Essential hypertension     Excessive sweating     Hereditary and idiopathic peripheral neuropathy     Hypothyroidism     Injury, other and unspecified, knee, leg, ankle, and foot     Other and unspecified hyperlipidemia     Palpitations     Personal history of tobacco use, presenting hazards to health     Presbyopia     Solitary pulmonary nodule     Special screening for other conditions     Tinea pedis     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     Type 2 diabetes mellitus (H)     Pressure injury of left heel, stage 3 (H)     Past Medical History:   Diagnosis Date     Benign essential hypertension      Hypothyroidism      Type 2 diabetes mellitus (H)      Exam:  /70   Pulse 114   Temp 97.4  F (36.3  C) (Temporal)   Resp 18   Wound (used by OP [facility] only) 02/05/21 1131 Left (Active)   Thickness/Stage Stage 3 03/02/21 0900   Dressing Appearance moist drainage 03/02/21 0900   Base necrotic;slough 03/02/21 0900   Periwound macerated 03/02/21 0900   Periwound Temperature warm 03/02/21 0900   Periwound Skin Turgor soft 03/02/21 0900   Edges open 03/02/21 0900   Length (cm) 2 03/02/21 0900   Width (cm) 2.8 03/02/21 0900   Depth (cm) 0.4 03/02/21 0900   Wound (cm^2) 5.6 cm^2 03/02/21 0900   Wound Volume (cm^3) 2.24 cm^3 03/02/21 0900   Wound  healing % 54.29 03/02/21 0900   Drainage Characteristics/Odor serosanguineous 03/02/21 0900   Drainage Amount moderate 03/02/21 0900   Care, Wound debrided 03/02/21 0900       Wound (used by OP [facility] only) 02/11/21 0930 Left unspecified (Active)   Thickness/Stage full thickness 03/02/21 0900   Base granulating 03/02/21 0900   Periwound intact 03/02/21 0900   Periwound Temperature warm 03/02/21 0900   Periwound Skin Turgor soft 03/02/21 0900   Edges open 03/02/21 0900   Length (cm) 0.7 03/02/21 0900   Width (cm) 0.7 03/02/21 0900   Depth (cm) 0.1 03/02/21 0900   Wound (cm^2) 0.49 cm^2 03/02/21 0900   Wound Volume (cm^3) 0.05 cm^3 03/02/21 0900   Wound healing % 51 03/02/21 0900   Drainage Amount moderate 03/02/21 0900   Care, Wound debrided 03/02/21 0900     Left heel pressure ulceration, debrided sharply with a 15 blade after utilization of 4% topical lidocaine, bleeding was controlled with application of light pressure, patient tolerated procedure well without complications.  Total contact cast then applied over Spandage for dermal interstitial edema management, tolerated total contact cast placement without complications.      Impression: Pressure induced left heel ulceration, total contact cast application #1, history of recent left hip fracture repair    Plan: Total contact cast #1, will return in 48 hours for application of total contact cast #2.  Wound treated with antimicrobial endoform and foam.  Adjunct of micronutrient utilization.      Skip Mauricio MD on 3/2/2021 at 7:49 PM

## 2021-03-04 ENCOUNTER — HOSPITAL ENCOUNTER (OUTPATIENT)
Dept: WOUND CARE | Facility: CLINIC | Age: 76
End: 2021-03-04
Attending: SURGERY
Payer: MEDICARE

## 2021-03-04 VITALS — TEMPERATURE: 98.2 F | HEART RATE: 87 BPM | DIASTOLIC BLOOD PRESSURE: 87 MMHG | SYSTOLIC BLOOD PRESSURE: 116 MMHG

## 2021-03-04 DIAGNOSIS — L89.623 PRESSURE INJURY OF LEFT HEEL, STAGE 3 (H): ICD-10-CM

## 2021-03-04 PROCEDURE — 29445 APPL RIGID TOT CNTC LEG CAST: CPT | Performed by: SURGERY

## 2021-03-04 NOTE — DISCHARGE INSTRUCTIONS
[facility]  [address] [address]    Call us at [phone] if you have any questions about your wounds, have redness or swelling around your wound, have a fever of 101 or greater or if you have any other problems or concerns. We answer the phone Monday through Friday 8 am to 4 pm, please leave a message as we check the voicemail frequently throughout the day.     Braxton Mantilla      1945    Medications/supplements to aid in healin packet of Coleman Supplement into your favorite beverage twice a day. Purchase at [facility] [employer] in [address] [facility], [place] or [employer]  Vitamin D3 10,000 iu per day  Vitamin C 2,000 mg daily  Methyl Folate 1000 mcg daily  Vitamin B 12 1000 mcg daily  Wound care recommendations to Left Heel  Cleanse with vashe protocol  Cover wound with Endoform antimicrobial cut to size slightly larger than wound,  moisten with saline  (this is supposed to dissolve into wound, if it doesn't remove and replace)  Cover wound with hydrofera blue ready transfer, spandage and TCC Foam and Cast  Change dressing weekly on   You need to stay off your foot at all times to help heal your wound.  You should wear your offloading boot at all times.  Use Knee roller to keep your weight off your foot.     MATT Mauricio M.D.. 2021          If you had a positive experience please indicate on your patient satisfaction survey form that [facility] will be sending you.

## 2021-03-08 NOTE — PROGRESS NOTES
[facility] [place] Progress Note    Subject: Braxton Mantilla original consultation 02/04/2021, significant cellulitis right heel with thick eschar, has been debrided, total contact casting was initiated, this was after repair of a traumatic left hip fracture, past history of tobacco utilization, adult onset diabetes    Patient Active Problem List   Diagnosis     Chronic atrial fibrillation (H)     Closed displaced intertrochanteric fracture of left femur, initial encounter (H)     Fall, initial encounter     Knee abrasion, left, initial encounter     Long term current use of anticoagulant therapy     Abdominal aortic aneurysm (AAA) (H)     Astigmatism     Borderline glaucoma     Cataract     Essential hypertension     Excessive sweating     Hereditary and idiopathic peripheral neuropathy     Hypothyroidism     Injury, other and unspecified, knee, leg, ankle, and foot     Other and unspecified hyperlipidemia     Palpitations     Personal history of tobacco use, presenting hazards to health     Presbyopia     Solitary pulmonary nodule     Special screening for other conditions     Tinea pedis     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  Patient will return to the clinic in 1 weeks time    Skip Mauricio MD on 3/8/2021 at 2:34 PM

## 2021-03-11 ENCOUNTER — HOSPITAL ENCOUNTER (OUTPATIENT)
Dept: WOUND CARE | Facility: CLINIC | Age: 76
Discharge: HOME | End: 2021-03-11
Attending: SURGERY | Admitting: SURGERY

## 2021-03-11 DIAGNOSIS — L89.623 PRESSURE INJURY OF LEFT HEEL, STAGE 3 (H): ICD-10-CM

## 2021-03-11 PROCEDURE — 11719 TRIM NAIL(S) ANY NUMBER: CPT | Mod: XU

## 2021-03-11 PROCEDURE — 29581 APPL MULTLAYER CMPRN SYS LEG: CPT | Performed by: SURGERY

## 2021-03-11 PROCEDURE — 97602 WOUND(S) CARE NON-SELECTIVE: CPT

## 2021-03-11 PROCEDURE — 11719 TRIM NAIL(S) ANY NUMBER: CPT | Performed by: SURGERY

## 2021-03-11 RX ORDER — ASCORBIC ACID 100 MG
1000 TABLET,CHEWABLE ORAL
COMMUNITY
Start: 2021-02-05

## 2021-03-11 NOTE — PROGRESS NOTES
[facility] [place] Progress Note    Subject: Braxton Mantilla total contact cast left lower extremity, developed a pressure heel after had a fracture of the left hip, took a shower yesterday, left foot wet, macerated, cannot reapply cast today.    Patient Active Problem List   Diagnosis     Chronic atrial fibrillation (H)     Closed displaced intertrochanteric fracture of left femur, initial encounter (H)     Fall, initial encounter     Knee abrasion, left, initial encounter     Long term current use of anticoagulant therapy     Abdominal aortic aneurysm (AAA) (H)     Astigmatism     Borderline glaucoma     Cataract     Essential hypertension     Excessive sweating     Hereditary and idiopathic peripheral neuropathy     Hypothyroidism     Injury, other and unspecified, knee, leg, ankle, and foot     Other and unspecified hyperlipidemia     Palpitations     Personal history of tobacco use, presenting hazards to health     Presbyopia     Solitary pulmonary nodule     Special screening for other conditions     Tinea pedis     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     Left foot wet, macerated, washed the entire foot and between the toes with hypochlorous acid, allowed adequate drying time, placed edema wear and 2 layer wrap, toenails trimmed and debrided right and left feet, hypertrophic, see photo imaging of left heel wound.        Impression: Left heel ulceration, pressure, status post left traumatic hip fracture, serial total contact casting, foot became wet after falling within the past 24 hours    Plan: We will dress the wounds with Hydrofera Blue, edema wear, 2 layer wrap, cannot place total contact cast back on due to maceration of left  foot.  He denies any form of tobacco utilization.  Adjunctive micronutrients, instructed to remain off work as much as possible and when sitting to elevate his foot so that there is no pressure on the left posterior lateral heel wound.  Patient will return to the clinic in 1 weeks time,   Skip Mauricio MD on 3/11/2021 at 12:41 PM

## 2021-03-11 NOTE — DISCHARGE INSTRUCTIONS
[facility]  [address] [address]    Call us at [phone] if you have any questions about your wounds, have redness or swelling around your wound, have a fever of 101 or greater or if you have any other problems or concerns. We answer the phone Monday through Friday 8 am to 4 pm, please leave a message as we check the voicemail frequently throughout the day.     Braxton Mantilla      1945    Medications/supplements to aid in healin packet of Coleman Supplement into your favorite beverage twice a day. Purchase at [facility] in Suite [facility], [place] or [employer]  Vitamin D3 10,000 iu per day  Vitamin C 2,000 mg daily  Methyl Folate 1000 mcg daily  Vitamin B 12 1000 mcg daily    Wound care recommendations to Left Heel  Cleanse with vashe protocol  Cover wound with Endoform antimicrobial cut to size slightly larger than wound, moisten with saline  (this is supposed to dissolve into wound, if it doesn't remove and replace)  Cover wound with hydrofera blue ready transfer, spandage and two layer wrap.  Change dressing weekly on   You need to stay off your foot at all times to help heal your wound.  You should wear your offloading boot at all times.  Use Knee roller to keep your weight off your foot.     MATT Mauricio M.D. 2021    Wound Clinic follow up in 1 week    COVID vaccine information at [email or web address]    If you had a positive experience please indicate that on your patient satisfaction survey form that [facility] will be sending you.

## 2021-03-18 ENCOUNTER — HOSPITAL ENCOUNTER (OUTPATIENT)
Dept: WOUND CARE | Facility: CLINIC | Age: 76
Discharge: HOME | End: 2021-03-18
Attending: SURGERY | Admitting: SURGERY

## 2021-03-18 VITALS — SYSTOLIC BLOOD PRESSURE: 107 MMHG | TEMPERATURE: 97.8 F | HEART RATE: 81 BPM | DIASTOLIC BLOOD PRESSURE: 69 MMHG

## 2021-03-18 DIAGNOSIS — L89.623 PRESSURE INJURY OF LEFT HEEL, STAGE 3 (H): ICD-10-CM

## 2021-03-18 PROCEDURE — 29581 APPL MULTLAYER CMPRN SYS LEG: CPT | Mod: XU | Performed by: SURGERY

## 2021-03-18 PROCEDURE — 11042 DBRDMT SUBQ TIS 1ST 20SQCM/<: CPT

## 2021-03-18 PROCEDURE — 11042 DBRDMT SUBQ TIS 1ST 20SQCM/<: CPT | Performed by: SURGERY

## 2021-03-18 NOTE — PROGRESS NOTES
[facility] [place] Progress Note    Subject: Braxton Mantilla returns for evaluation of left lateral heel ulceration which occurred after a fractured hip, has ceased tobacco utilization.  Being currently managed in a 2 layer wrap, total contact cast.  As the soaked the total contact cast inadvertently during a shower last week and ended up with significant foot maceration.  He has been able to maintain his 2 layer wrap dry, he declines resumption of total contact casting.    Patient Active Problem List   Diagnosis     Chronic atrial fibrillation (H)     Closed displaced intertrochanteric fracture of left femur, initial encounter (H)     Fall, initial encounter     Knee abrasion, left, initial encounter     Long term current use of anticoagulant therapy     Abdominal aortic aneurysm (AAA) (H)     Astigmatism     Borderline glaucoma     Cataract     Essential hypertension     Excessive sweating     Hereditary and idiopathic peripheral neuropathy     Hypothyroidism     Injury, other and unspecified, knee, leg, ankle, and foot     Other and unspecified hyperlipidemia     Palpitations     Personal history of tobacco use, presenting hazards to health     Presbyopia     Solitary pulmonary nodule     Special screening for other conditions     Tinea pedis     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     Type 2 diabetes mellitus (H)     Pressure injury of left heel, stage 3 (H)     Past Medical History:   Diagnosis Date     Benign essential hypertension      Hypothyroidism      Type 2 diabetes mellitus (H)      Exam:  /69   Pulse 81   Temp 97.8  F (36.6  C) (Temporal)   Wound (used by OP [facility] only) 02/05/21 1131 Left (Active)   Thickness/Stage Stage 3 03/18/21 1200   Dressing Appearance moist drainage 03/18/21 1200   Base slough 03/18/21 1200   Periwound macerated 03/18/21 1200   Periwound Temperature warm 03/18/21 1200   Periwound Skin Turgor soft 03/18/21 1200   Edges open 03/18/21 1200   Length (cm) 1.7  03/18/21 1200   Width (cm) 2.7 03/18/21 1200   Depth (cm) 0.1 03/18/21 1200   Wound (cm^2) 4.59 cm^2 03/18/21 1200   Wound Volume (cm^3) 0.46 cm^3 03/18/21 1200   Wound healing % 62.53 03/18/21 1200   Drainage Characteristics/Odor yellow;malodorous 03/18/21 1200   Drainage Amount large 03/18/21 1200   Care, Wound debrided 03/18/21 1200     75-year-old male, palpable left dorsalis pedis pulse, ulceration left lateral heel with eschar present    Procedure:   Patient was determined to be capable of making their own medical decisions and informed consent was obtained. Topical anesthetic of 4% lidocaine was applied, debridement was performed using a #15 blade down to and including subcutaneous tissue and biofilm bleeding controlled with light pressure. Patient tolerated procedure well.    Impression: Left lateral heel pressure ulceration after left total hip traumatic fracture and surgical repair    Plan: We will dress the wounds with antimicrobial endoform, Spandage, 2 layer wrap, he will purchase a Rooke boot today and wear at home at all times to provide environmental protection and prevent passive heel loss.  Patient will return to the clinic in 1 weeks time    Skip Mauricio MD on 3/18/2021 at 12:52 PM

## 2021-03-18 NOTE — DISCHARGE INSTRUCTIONS
[facility]  [address] [address]    Call us at [phone] if you have any questions about your wounds, have redness or swelling around your wound, have a fever of 101 or greater or if you have any other problems or concerns. We answer the phone Monday through Friday 8 am to 4 pm, please leave a message as we check the voicemail frequently throughout the day.     Braxton Mantilla      1945    Medications/supplements to aid in healin packet of Coleman Supplement into your favorite beverage twice a day. Purchase at [facility] in Suite [facility], [employer] or [employer]  Vitamin D3 10,000 iu per day  Vitamin C 2,000 mg daily  Methyl Folate 1000 mcg daily  Vitamin B 12 1000 mcg daily  Wound care recommendations to Left Heel  Cleanse with vashe protocol  Cover wound with Endoform antimicrobial cut to size slightly larger than wound,  moisten with saline  (this is supposed to dissolve into wound, if it doesn't remove and replace)  Cover wound with hydrofera blue ready transfer, spandage and two layer wrap.  Change dressing weekly on   You need to stay off your foot at all times to help heal your wound. Use ROOKE BOOT AT ALL TIMES  You should wear your offloading boot at all times.  Use Knee roller to keep your weight off your foot.       MATT Mauricio M.D.. 2021          If you had a positive experience please indicate on your patient satisfaction survey form that [facility] will be sending you.

## 2021-03-25 ENCOUNTER — HOSPITAL ENCOUNTER (OUTPATIENT)
Dept: WOUND CARE | Facility: CLINIC | Age: 76
Discharge: HOME | End: 2021-03-25
Attending: SURGERY | Admitting: SURGERY

## 2021-03-25 VITALS — SYSTOLIC BLOOD PRESSURE: 91 MMHG | DIASTOLIC BLOOD PRESSURE: 56 MMHG | TEMPERATURE: 98.5 F | HEART RATE: 94 BPM

## 2021-03-25 DIAGNOSIS — L89.623 PRESSURE INJURY OF LEFT HEEL, STAGE 3 (H): ICD-10-CM

## 2021-03-25 PROCEDURE — 11042 DBRDMT SUBQ TIS 1ST 20SQCM/<: CPT | Performed by: SURGERY

## 2021-03-25 PROCEDURE — 11042 DBRDMT SUBQ TIS 1ST 20SQCM/<: CPT

## 2021-03-25 RX ORDER — TAMSULOSIN HYDROCHLORIDE 0.4 MG/1
CAPSULE ORAL
COMMUNITY
Start: 2020-05-27

## 2021-03-25 RX ORDER — METHOCARBAMOL 500 MG/1
TABLET, FILM COATED ORAL
COMMUNITY
Start: 2020-05-27

## 2021-03-25 NOTE — PROGRESS NOTES
[facility] [place] Progress Note    Subject: Braxton Mantilla chronically left heel pressure ulceration after repair of left hip fracture.  Remote history of tobacco use.  Adult-onset diabetes.  Arterial studies without evidence of arterial occlusive disease.  Wound is stalled.  We had started with total contact cast however he had gotten it wet, foot became quite more macerated.  It is clear that the wound is stalled and standard of care at this time would be return to total contact cast.    Patient Active Problem List   Diagnosis     Chronic atrial fibrillation (H)     Closed displaced intertrochanteric fracture of left femur, initial encounter (H)     Fall, initial encounter     Knee abrasion, left, initial encounter     Long term current use of anticoagulant therapy     Abdominal aortic aneurysm (AAA) (H)     Astigmatism     Borderline glaucoma     Cataract     Essential hypertension     Excessive sweating     Hereditary and idiopathic peripheral neuropathy     Hypothyroidism     Injury, other and unspecified, knee, leg, ankle, and foot     Other and unspecified hyperlipidemia     Palpitations     Personal history of tobacco use, presenting hazards to health     Presbyopia     Solitary pulmonary nodule     Special screening for other conditions     Tinea pedis     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     Type 2 diabetes mellitus (H)     Pressure injury of left heel, stage 3 (H)     Past Medical History:   Diagnosis Date     Benign essential hypertension      Hypothyroidism      Type 2 diabetes mellitus (H)      Exam:  BP 91/56   Pulse 94   Temp 98.5  F (36.9  C)   Wound (used by OP [facility] only) 02/05/21 1131 Left (Active)   Thickness/Stage Stage 3 03/25/21 1200   Dressing Appearance moist drainage 03/25/21 1200   Periwound macerated 03/25/21 1200   Periwound Temperature warm 03/25/21 1200   Periwound Skin Turgor soft 03/25/21 1200   Edges open 03/25/21 1200   Length (cm) 1.9 03/25/21 1200   Width  (cm) 2.4 03/25/21 1200   Depth (cm) 0.4 03/25/21 1200   Wound (cm^2) 4.56 cm^2 03/25/21 1200   Wound Volume (cm^3) 1.82 cm^3 03/25/21 1200   Wound healing % 62.78 03/25/21 1200   Drainage Characteristics/Odor tan;creamy;serous 03/25/21 1200   Drainage Amount moderate 03/25/21 1200   Care, Wound debrided;barrier applied 03/25/21 1200       Wound (used by OP [facility] only) 02/11/21 0930 Left unspecified (Active)   Dressing Appearance moist drainage 03/25/21 1200   Drainage Characteristics/Odor serosanguineous 03/25/21 1200   Drainage Amount moderate 03/25/21 1200     Wound with extensive biofilm, necrotic tissue, no bone or tendon exposure    Procedure:   Patient was determined to be capable of making their own medical decisions and informed consent was obtained. Topical anesthetic of 4% lidocaine was applied, debridement was performed using a #15 blade down to and including subcutaneous tissue biofilm bleeding controlled with light pressure and application of Gelfoam. Patient tolerated procedure well.    Impression: Left heel pressure ulceration with intact arterial blood flow    Plan: Reinitiate total contact casting on March 30 with cast change on April 1 then on a weekly basis thereafter.  Spandage and 2 layer wrap placed      Skip Mauricio MD on 3/25/2021 at 1:25 PM

## 2021-03-25 NOTE — DISCHARGE INSTRUCTIONS
[facility]  [address] [address]    Call us at [phone] if you have any questions about your wounds, have redness or swelling around your wound, have a fever of 101 or greater or if you have any other problems or concerns. We answer the phone Monday through Friday 8 am to 4 pm, please leave a message as we check the voicemail frequently throughout the day.     Braxton Mantilla      1945    Medications/supplements to aid in healin packet of Coleman Supplement into your favorite beverage twice a day. Purchase at [facility] in Suite [facility], [employer] or [employer]  Vitamin D3 10,000 iu per day  Vitamin C 2,000 mg daily  Methyl Folate 1000 mcg daily  Vitamin B 12 1000 mcg daily  Wound care recommendations to Left Heel  Cleanse with vashe protocol  Cover wound with iodoflex, Cover wound with hydrofera blue ready transfer, spandage and two layer wrap.  Change dressing weekly on   You need to stay off your foot at all times to help heal your wound.   Use ROOKE BOOT AT ALL TIMES  You should wear your offloading boot at all times.  Use Knee roller to keep your weight off your foot.       MATT Mauricio M.D.. 2021          If you had a positive experience please indicate on your patient satisfaction survey form that [facility] will be sending you.

## 2021-03-29 ENCOUNTER — HOSPITAL ENCOUNTER (OUTPATIENT)
Dept: WOUND CARE | Facility: CLINIC | Age: 76
Discharge: HOME | End: 2021-03-29
Attending: SURGERY | Admitting: SURGERY

## 2021-03-29 VITALS — DIASTOLIC BLOOD PRESSURE: 75 MMHG | SYSTOLIC BLOOD PRESSURE: 108 MMHG | TEMPERATURE: 97.5 F | HEART RATE: 73 BPM

## 2021-03-29 DIAGNOSIS — L89.623 PRESSURE INJURY OF LEFT HEEL, STAGE 3 (H): ICD-10-CM

## 2021-03-29 PROCEDURE — 11042 DBRDMT SUBQ TIS 1ST 20SQCM/<: CPT | Performed by: SURGERY

## 2021-03-29 PROCEDURE — 97597 DBRDMT OPN WND 1ST 20 CM/<: CPT | Performed by: SURGERY

## 2021-03-29 PROCEDURE — 29445 APPL RIGID TOT CNTC LEG CAST: CPT

## 2021-03-29 PROCEDURE — 97597 DBRDMT OPN WND 1ST 20 CM/<: CPT

## 2021-03-29 NOTE — DISCHARGE INSTRUCTIONS
[facility]  [address] [facility] [address] [address]    Call us at [phone] if you have any questions about your wounds, have redness or swelling around your wound, have a fever of 101 or greater or if you have any other problems or concerns. We answer the phone Monday through Friday 8 am to 4 pm, please leave a message as we check the voicemail frequently throughout the day.     Braxton Mantilla      1945    Medications/supplements to aid in healin. 1 packet of Coleman Supplement into your favorite beverage twice a day. Purchase at [facility] [employer] in Suite [facility], [employer]  2. Vitamin D3 10,000 iu per day  3. Vitamin C 2,000 mg daily  4. Methyl Folate 1000 mcg daily   5. Vitamin B 12 1000 mcg daily    A diet high in protein is important for wound healing, we recommend getting 90 grams of protein per day. Taking protein shakes or bars are a good way to get extra protein in your diet.  Other good sources of protein:  Pork 26g per 3 oz  Whey protein powder - 24g per scoop (on average)  Greek yogurt - 23g per 8oz   Chicken or Turkey - 23g per 3oz  Fish - 20-25g per 3oz  Beef - 18-23g per 3oz  Navy beans - 20g per cup  Cottage cheese - 14g per 1/2 cup   Lentils - 13g per 1/4 cup  Beef jerky 13g per 1oz  2% milk - 8g per cup  Peanut butter - 8g per 2 tablespoons  Eggs - 6g per egg  Mixed nuts - 6g per 2oz   Medications/supplements to aid in healin packet of Coleman Supplement into your favorite beverage twice a day. Purchase at [facility] in Suite [facility], [employer]  Vitamin D3 10,000 iu per day  Vitamin C 2,000 mg daily  Methyl Folate 1000 mcg daily  Vitamin B 12 1000 mcg daily    Wound care recommendations to Left Heel: keep cast dry and use Cast protector for showers  Cleanse with protosan protocol  Cover wound with 2 layers of endoform and Hydrofera Blue to cover size of wound,   Spandage and  Cast application   Change dressing Thursday and weekly    You need to stay off your foot at all times to help heal your wound.  Use  BOOT AT ALL TIMES You should wear your offloading boot at all times.   When you sit raise your ankle above your hips to promote wound healing.      MATT Mauricio M.D.. March 29, 2021    Wound Clinic follow up as scheduled  COVID vaccine information at [email or web address]    If you had a positive experience please indicate that on your patient satisfaction survey form that [facility] will be sending you.

## 2021-03-29 NOTE — ADDENDUM NOTE
Encounter addended by: Catalina Ortez RN on: 3/29/2021 1:44 PM   Actions taken: Charge Capture section accepted

## 2021-03-29 NOTE — PROGRESS NOTES
[facility] [place] Progress Note    Subject: Braxton Mantilla history of chronic left heel ulceration, has stalled, history of left hip fracture with subsequent development of pressure ulceration of the left heel.  Adult-onset diabetes.    Patient Active Problem List   Diagnosis     Chronic atrial fibrillation (H)     Closed displaced intertrochanteric fracture of left femur, initial encounter (H)     Fall, initial encounter     Knee abrasion, left, initial encounter     Long term current use of anticoagulant therapy     Abdominal aortic aneurysm (AAA) (H)     Astigmatism     Borderline glaucoma     Cataract     Essential hypertension     Excessive sweating     Hereditary and idiopathic peripheral neuropathy     Hypothyroidism     Injury, other and unspecified, knee, leg, ankle, and foot     Other and unspecified hyperlipidemia     Palpitations     Personal history of tobacco use, presenting hazards to health     Presbyopia     Solitary pulmonary nodule     Special screening for other conditions     Tinea pedis     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     Type 2 diabetes mellitus (H)     Pressure injury of left heel, stage 3 (H)     Past Medical History:   Diagnosis Date     Benign essential hypertension      Hypothyroidism      Type 2 diabetes mellitus (H)      Exam:  /75   Pulse 73   Temp 97.5  F (36.4  C) (Temporal)   Wound (used by OP [facility] only) 02/05/21 1131 Left (Active)   Dressing Appearance moist drainage 03/29/21 1242   Length (cm) 1.7 03/29/21 1242   Width (cm) 2.7 03/29/21 1242   Depth (cm) 0.8 03/29/21 1242   Wound (cm^2) 4.59 cm^2 03/29/21 1242   Wound Volume (cm^3) 3.67 cm^3 03/29/21 1242   Wound healing % 62.53 03/29/21 1242   Drainage Characteristics/Odor serosanguineous 03/29/21 1242   Drainage Amount moderate 03/29/21 1242     No cellulitis, no odor, small mild biofilm was debrided    Procedure:   Patient was determined to be capable of making their own medical decisions and  informed consent was obtained. Topical anesthetic of 4% lidocaine was applied, debridement was performed using a #15 blade down to and including subcutaneous tissue and biofilm left heel bleeding controlled with light pressure. Patient tolerated procedure well.  Double layer of endoform applied, Hydrofera Blue, Spandage, total contact cast per routine  Impression: Chronic left heel ulceration, pressure, history of left hip fracture    Plan: We will dress the wounds with 2 layer endoform, Hydrofera Blue, Spandage, total contact cast.  Change cast in 3 days then on a weekly basis, Thursdays.      Skip Mauricio MD on 3/29/2021 at 12:47 PM

## 2021-04-01 ENCOUNTER — HOSPITAL ENCOUNTER (OUTPATIENT)
Dept: WOUND CARE | Facility: CLINIC | Age: 76
Discharge: HOME | End: 2021-04-01
Attending: SURGERY | Admitting: SURGERY

## 2021-04-01 VITALS
DIASTOLIC BLOOD PRESSURE: 56 MMHG | HEART RATE: 84 BPM | SYSTOLIC BLOOD PRESSURE: 94 MMHG | RESPIRATION RATE: 18 BRPM | TEMPERATURE: 97 F

## 2021-04-01 DIAGNOSIS — L89.623 PRESSURE INJURY OF LEFT HEEL, STAGE 3 (H): ICD-10-CM

## 2021-04-01 PROCEDURE — 17250 CHEM CAUT OF GRANLTJ TISSUE: CPT | Performed by: SURGERY

## 2021-04-01 PROCEDURE — 11042 DBRDMT SUBQ TIS 1ST 20SQCM/<: CPT

## 2021-04-01 PROCEDURE — 11042 DBRDMT SUBQ TIS 1ST 20SQCM/<: CPT | Performed by: SURGERY

## 2021-04-01 PROCEDURE — 29445 APPL RIGID TOT CNTC LEG CAST: CPT | Mod: XU

## 2021-04-01 NOTE — DISCHARGE INSTRUCTIONS
[facility]  [address] [address]    Call us at [phone] if you have any questions about your wounds, have redness or swelling around your wound, have a fever of 101 or greater or if you have any other problems or concerns. We answer the phone Monday through Friday 8 am to 4 pm, please leave a message as we check the voicemail frequently throughout the day.     Braxton Mantilla      1945    Wound care recommendations to Left Heel: keep cast dry and use Cast protector for showers  Cleanse with protosan protocol  Cover wound with 2 layers of endoform and Hydrofera Blue to cover size of wound,  Spandage, TCC FOAM and Cast application  Change dressing Thursday and weekly  You need to stay off your foot at all times to help heal your wound.  Use BOOT AT ALL TIMES You should wear your offloading boot at all times.  When you sit raise your ankle above your hips to promote wound healing.       MATT Mauricio M.D.. April 1, 2021      If you had a positive experience please indicate on your patient satisfaction survey form that [facility] will be sending you.

## 2021-04-01 NOTE — PROGRESS NOTES
[facility] [place] Progress Note    Subject: Braxton Mantilla left heel wound, pressure after left hip fracture, adult-onset diabetes.  Noninvasive arterial testing performed February 5, 2021    Patient Active Problem List   Diagnosis     Chronic atrial fibrillation (H)     Closed displaced intertrochanteric fracture of left femur, initial encounter (H)     Fall, initial encounter     Knee abrasion, left, initial encounter     Long term current use of anticoagulant therapy     Abdominal aortic aneurysm (AAA) (H)     Astigmatism     Borderline glaucoma     Cataract     Essential hypertension     Excessive sweating     Hereditary and idiopathic peripheral neuropathy     Hypothyroidism     Injury, other and unspecified, knee, leg, ankle, and foot     Other and unspecified hyperlipidemia     Palpitations     Personal history of tobacco use, presenting hazards to health     Presbyopia     Solitary pulmonary nodule     Special screening for other conditions     Tinea pedis     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     Type 2 diabetes mellitus (H)     Pressure injury of left heel, stage 3 (H)     Past Medical History:   Diagnosis Date     Benign essential hypertension      Hypothyroidism      Type 2 diabetes mellitus (H)      Exam:  BP 94/56 (BP Location: Right arm)   Pulse 84   Temp 97  F (36.1  C) (Temporal)   Resp 18   Wound (used by OP [facility] only) 02/05/21 1131 Left (Active)   Dressing Appearance moist drainage 04/01/21 1300   Length (cm) 1.7 04/01/21 1300   Width (cm) 2.6 04/01/21 1300   Depth (cm) 0.5 04/01/21 1300   Wound (cm^2) 4.42 cm^2 04/01/21 1300   Wound Volume (cm^3) 2.21 cm^3 04/01/21 1300   Wound healing % 63.92 04/01/21 1300   Drainage Characteristics/Odor serosanguineous 04/01/21 1300   Drainage Amount moderate 04/01/21 1300       Left heel pressure ulceration, small mild necrotic debris, no bone or tendon exposure    Procedure:   Patient was determined to be capable of making their own  medical decisions and informed consent was obtained. Topical anesthetic of 4% lidocaine was applied, debridement was performed using a #15 blade down to and including subcutaneous tissue and biofilm, bleeding controlled with light application of silver nitrate and light pressure. Patient tolerated procedure well.    Total contact cast then applied standard fashion over Spandage to control interstitial edema, lymphedema related to diabetic status    Impression: Chronic left heel pressure ulceration status post left hip fracture, adult-onset diabetes    Plan: Total contact cast left lower extremity  Patient will return to the clinic in 1 weeks time    Skip Mauricio MD on 4/1/2021 at 1:02 PM

## 2021-04-09 ENCOUNTER — HOSPITAL ENCOUNTER (OUTPATIENT)
Dept: WOUND CARE | Facility: CLINIC | Age: 76
Discharge: HOME | End: 2021-04-09
Attending: PHYSICIAN ASSISTANT | Admitting: PHYSICIAN ASSISTANT

## 2021-04-09 VITALS — TEMPERATURE: 98.1 F | SYSTOLIC BLOOD PRESSURE: 92 MMHG | HEART RATE: 67 BPM | DIASTOLIC BLOOD PRESSURE: 55 MMHG

## 2021-04-09 DIAGNOSIS — L89.623 PRESSURE INJURY OF LEFT HEEL, STAGE 3 (H): ICD-10-CM

## 2021-04-09 PROCEDURE — 97597 DBRDMT OPN WND 1ST 20 CM/<: CPT

## 2021-04-09 PROCEDURE — 11042 DBRDMT SUBQ TIS 1ST 20SQCM/<: CPT | Performed by: PHYSICIAN ASSISTANT

## 2021-04-09 NOTE — PROGRESS NOTES
[facility]    ASSESSMENT:   1. (L89.623) Pressure injury of left heel, stage 3 (H)  2. Current smoker    PLAN/DISCUSSION:   1. Wound care plan: continue with Endoform, HydroFera Blue, and TCC cast system    HISTORY OF PRESENT ILLNESS:   Braxton Mantilla is a 75 year old male who returns today for a total contact cast change. He has been regularly followed by Dr. Mauricio, please see his dictations for full history. Today the wound is measuring a bit smaller in diameter but continues to have necrotic tissue in base.      VITALS: BP 92/55 (BP Location: Right arm)   Pulse 67   Temp 98.1  F (36.7  C) (Temporal)      PHYSICAL EXAM:  GENERAL: Patient is alert and oriented and in no acute distress  INTEGUMENTARY:   Wound (used by OP [facility] only) 02/05/21 1131 Left (Active)   Thickness/Stage Stage 3 04/09/21 1200   Dressing Appearance moist drainage 04/09/21 1200   Base pink;moist;granulating 04/09/21 1200   Periwound macerated 04/09/21 1200   Periwound Temperature warm 04/09/21 1200   Periwound Skin Turgor soft 04/09/21 1200   Edges open 04/09/21 1200   Length (cm) 1.4 04/09/21 1200   Width (cm) 2.2 04/09/21 1200   Depth (cm) 0.5 04/09/21 1200   Wound (cm^2) 3.08 cm^2 04/09/21 1200   Wound Volume (cm^3) 1.54 cm^3 04/09/21 1200   Wound healing % 74.86 04/09/21 1200   Drainage Characteristics/Odor serosanguineous;yellow 04/09/21 1200   Drainage Amount moderate 04/09/21 1200   Care, Wound debrided 04/09/21 1200   Dressing Care dressing applied 04/09/21 1200           PROCEDURE: 4% topical lidocaine was applied to the wound by nursing staff. Patient was determined to be capable of making their own medical decisions and informed consent was obtained. Using a 15 blade a surgical debridement was performed down to and including subcutaneous tissue of <20 cm2. Hemostasis was achieved with pressure. The patient tolerated the procedure well.     LELE ROY PA-C

## 2021-04-09 NOTE — DISCHARGE INSTRUCTIONS
[facility]  [address] [address]    Call us at [phone] if you have any questions about your wounds, have redness or swelling around your wound, have a fever of 101 or greater or if you have any other problems or concerns. We answer the phone Monday through Friday 8 am to 4 pm, please leave a message as we check the voicemail frequently throughout the day.     Braxton Mantilla      1945    Wound care recommendations to Left Heel: keep cast dry and use Cast protector for showers  Cleanse with protosan protocol  Cover wound with 2 layers of endoform and Hydrofera Blue to cover size of wound, Spandage, TCC FOAM and Cast application  Change dressing 4/15/21 at 12:20pm    You need to stay off your foot at all times to help heal your wound.  Use BOOT AT ALL TIMES You should wear your offloading boot at all times.    When you sit raise your ankle above your hips to promote wound healing.       Nae Jaramillo PA-C. April 9, 2021    Return as scheduled    If you had a positive experience please indicate on your patient satisfaction survey form that [facility] will be sending you.

## 2021-04-15 ENCOUNTER — HOSPITAL ENCOUNTER (OUTPATIENT)
Dept: WOUND CARE | Facility: CLINIC | Age: 76
Discharge: HOME | End: 2021-04-15
Attending: SURGERY | Admitting: SURGERY
Payer: COMMERCIAL

## 2021-04-15 VITALS
DIASTOLIC BLOOD PRESSURE: 63 MMHG | HEART RATE: 69 BPM | RESPIRATION RATE: 16 BRPM | TEMPERATURE: 97.9 F | SYSTOLIC BLOOD PRESSURE: 112 MMHG

## 2021-04-15 DIAGNOSIS — L89.623 PRESSURE INJURY OF LEFT HEEL, STAGE 3 (H): ICD-10-CM

## 2021-04-15 PROCEDURE — 97597 DBRDMT OPN WND 1ST 20 CM/<: CPT | Performed by: SURGERY

## 2021-04-15 PROCEDURE — 29445 APPL RIGID TOT CNTC LEG CAST: CPT | Mod: XU

## 2021-04-15 PROCEDURE — 11042 DBRDMT SUBQ TIS 1ST 20SQCM/<: CPT

## 2021-04-15 NOTE — PROGRESS NOTES
[facility] [place] Progress Note    Subject: Braxton Mantilla serial total contact casting left lower extremity, denies pain.  History of left hip fracture status post repair with subsequent development of pressure ulcer on the left heel.    Patient Active Problem List   Diagnosis     Chronic atrial fibrillation (H)     Closed displaced intertrochanteric fracture of left femur, initial encounter (H)     Fall, initial encounter     Knee abrasion, left, initial encounter     Long term current use of anticoagulant therapy     Abdominal aortic aneurysm (AAA) (H)     Astigmatism     Borderline glaucoma     Cataract     Essential hypertension     Excessive sweating     Hereditary and idiopathic peripheral neuropathy     Hypothyroidism     Injury, other and unspecified, knee, leg, ankle, and foot     Other and unspecified hyperlipidemia     Palpitations     Personal history of tobacco use, presenting hazards to health     Presbyopia     Solitary pulmonary nodule     Special screening for other conditions     Tinea pedis     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     Type 2 diabetes mellitus (H)     Pressure injury of left heel, stage 3 (H)     Past Medical History:   Diagnosis Date     Benign essential hypertension      Hypothyroidism      Type 2 diabetes mellitus (H)      Exam:  /63 (BP Location: Right arm)   Pulse 69   Temp 97.9  F (36.6  C) (Temporal)   Resp 16   Wound (used by OP [facility] only) 02/05/21 1131 Left (Active)   Thickness/Stage Stage 3 04/15/21 1237   Dressing Appearance moist drainage 04/15/21 1237   Base granulating 04/15/21 1237   Periwound intact 04/15/21 1237   Periwound Temperature warm 04/15/21 1237   Periwound Skin Turgor soft 04/15/21 1237   Edges open 04/15/21 1237   Length (cm) 0.8 04/15/21 1237   Width (cm) 1.7 04/15/21 1237   Depth (cm) 0.5 04/15/21 1237   Wound (cm^2) 1.36 cm^2 04/15/21 1237   Wound Volume (cm^3) 0.68 cm^3 04/15/21 1237   Wound healing % 88.9 04/15/21 1237    Drainage Characteristics/Odor serosanguineous 04/15/21 1237   Drainage Amount moderate 04/15/21 1237   Care, Wound barrier applied;debrided 04/15/21 1237     Left heel wound, chronic, no bone or tendon exposure, selectively debrided with knife curette after application of 4% lidocaine, patient tolerated procedure well, antimicrobial endoform placed, Spandage, cast padding, cast placed, dry for 20 minutes.        Impression: Chronic left heel ulceration, pressure ulceration, sequential total contact casting    Plan: We will dress the wounds with antimicrobial endoform, total contact cast.  Patient will return to the clinic in 1 weeks time    Skip Mauricio MD on 4/15/2021 at 1:18 PM

## 2021-04-15 NOTE — DISCHARGE INSTRUCTIONS
[facility]  [address] [address]    Call us at [phone] if you have any questions about your wounds, have redness or swelling around your wound, have a fever of 101 or greater or if you have any other problems or concerns. We answer the phone Monday through Friday 8 am to 4 pm, please leave a message as we check the voicemail frequently throughout the day.     Braxton Mantilla      1945    Wound care recommendations to Left Heel: keep cast dry and use Cast protector  for showers  Cleanse with protosan protocol  Cover wound with endoform antimicrobial to fill cavity and Hydrofera Blue to cover size of wound, Spandage, TCC FOAM and Cast application    You need to stay off your foot at all times to help heal your wound.  Use BOOT AT ALL TIMES You should wear your offloading boot at all times.  When you sit raise your ankle above your hips to promote wound healing.     MATT Mauricio M.D.. April 15, 2021        If you had a positive experience please indicate on your patient satisfaction survey form from [facility].

## 2021-04-22 ENCOUNTER — HOSPITAL ENCOUNTER (OUTPATIENT)
Dept: WOUND CARE | Facility: CLINIC | Age: 76
Discharge: HOME | End: 2021-04-22
Attending: PHYSICIAN ASSISTANT | Admitting: PHYSICIAN ASSISTANT

## 2021-04-22 VITALS
DIASTOLIC BLOOD PRESSURE: 65 MMHG | HEART RATE: 85 BPM | TEMPERATURE: 99.1 F | SYSTOLIC BLOOD PRESSURE: 94 MMHG | RESPIRATION RATE: 20 BRPM

## 2021-04-22 DIAGNOSIS — L89.623 PRESSURE INJURY OF LEFT HEEL, STAGE 3 (H): ICD-10-CM

## 2021-04-22 PROCEDURE — 97602 WOUND(S) CARE NON-SELECTIVE: CPT

## 2021-04-22 PROCEDURE — 99213 OFFICE O/P EST LOW 20 MIN: CPT | Performed by: SURGERY

## 2021-04-22 NOTE — DISCHARGE INSTRUCTIONS
[facility]  [address] [address]    Call us at [phone] if you have any questions about your wounds, have redness or swelling around your wound, have a fever of 101 or greater or if you have any other problems or concerns. We answer the phone Monday through Friday 8 am to 4 pm, please leave a message as we check the voicemail frequently throughout the day.     Braxton Mantilla      1945  [facility] Phone:[phone] Fax: [phone]    Wound care recommendations to Left Heel: keep foot dry with cast protector for showers  After cleansing with saline or wound cleanser, apply small amount of VASHE on gauze, lay into wound bed, let sit for 15-30 minutes, remove gauze (do not rinse) then apply dressing.  Cover wound with endoform antimicrobial to fill cavity and Hydrofera Blue to cover size of wound, edemawear navy stripe then gauze and roll gauze change dressing MWF or three times a week.  You need to stay off your foot at all times to help heal your wound.  Use BOOT AT ALL TIMES You should wear your offloading boot at all times.  When you sit raise your ankle above your hips to promote wound healing.       MATT Mauricio M.D.. April 22, 2021      If you had a positive experience please indicate on your patient satisfaction survey form that [facility] will be sending you.

## 2021-04-22 NOTE — PROGRESS NOTES
[facility] [place] Progress Note    Subject: Braxton Mantilla not tolerating total contact cast, clearly total contact cast would be required for an additional anticipated 4 to 6 weeks for resolution of the wound of the left lower extremity, heel, initiated as a pressure ulcer after left hip fracture.    Patient Active Problem List   Diagnosis     Chronic atrial fibrillation (H)     Closed displaced intertrochanteric fracture of left femur, initial encounter (H)     Fall, initial encounter     Knee abrasion, left, initial encounter     Long term current use of anticoagulant therapy     Abdominal aortic aneurysm (AAA) (H)     Astigmatism     Borderline glaucoma     Cataract     Essential hypertension     Excessive sweating     Hereditary and idiopathic peripheral neuropathy     Hypothyroidism     Injury, other and unspecified, knee, leg, ankle, and foot     Other and unspecified hyperlipidemia     Palpitations     Personal history of tobacco use, presenting hazards to health     Presbyopia     Solitary pulmonary nodule     Special screening for other conditions     Tinea pedis     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     Type 2 diabetes mellitus (H)     Pressure injury of left heel, stage 3 (H)     Past Medical History:   Diagnosis Date     Benign essential hypertension      Hypothyroidism      Type 2 diabetes mellitus (H)      Exam:  BP 94/65 (BP Location: Right arm)   Pulse 85   Temp 99.1  F (37.3  C) (Temporal)   Resp 20   Wound (used by OP [facility] only) 02/05/21 1131 Left (Active)   Thickness/Stage Stage 3 04/22/21 1300   Dressing Appearance moist drainage 04/22/21 1300   Base slough 04/22/21 1300   Periwound intact 04/22/21 1300   Periwound Temperature warm 04/22/21 1300   Periwound Skin Turgor soft 04/22/21 1300   Edges open 04/22/21 1300   Length (cm) 0.9 04/22/21 1300   Width (cm) 1.9 04/22/21 1300   Depth (cm) 1 04/22/21 1300   Wound (cm^2) 1.71 cm^2 04/22/21 1300   Wound Volume (cm^3) 1.71 cm^3  04/22/21 1300   Wound healing % 86.04 04/22/21 1300   Drainage Characteristics/Odor serosanguineous 04/22/21 1300   Drainage Amount moderate 04/22/21 1300   Care, Wound debrided 04/22/21 1300     See photodocumentation and wound measurements, palpable left Dorsalis pedis pulse.  Interstitial edema controlled with Spandage and total anticast        Impression: Chronic left heel ulceration initiated as pressure ulcer after left hip fracture    Plan: Transition from total contact cast to a Aircast ambulatory boot with offloading capacity given patient's increasing discomfort with utilization of total contact casting.  We will dress the wounds with antimicrobial endoform, home care nursing, 3 days/week, soak wound with hypochlorous acid.  Micronutrients..  Patient will return to the clinic in 3 weeks time    Skip Mauricio MD on 4/22/2021 at 1:59 PM

## 2021-04-27 ENCOUNTER — TELEPHONE (OUTPATIENT)
Dept: WOUND CARE | Facility: CLINIC | Age: 76
End: 2021-04-27

## 2021-04-27 NOTE — TELEPHONE ENCOUNTER
I called [facility] home they have the referral. Intake was unsure why patient had not yet been scheduled. Patient is coming for dressing change today and will follow up with home care also.

## 2021-04-28 ENCOUNTER — TELEPHONE (OUTPATIENT)
Dept: WOUND CARE | Facility: CLINIC | Age: 76
End: 2021-04-28

## 2021-04-28 PROCEDURE — G0180 MD CERTIFICATION HHA PATIENT: HCPCS | Performed by: SURGERY

## 2021-05-03 ENCOUNTER — TELEPHONE (OUTPATIENT)
Dept: WOUND CARE | Facility: CLINIC | Age: 76
End: 2021-05-03

## 2021-05-03 NOTE — TELEPHONE ENCOUNTER
Returned call to Sonia who states that OT was delayed to lack to staff and she is unable to tell me when it will start.

## 2021-05-03 NOTE — TELEPHONE ENCOUNTER
Sonia with [facility] called to inform us that they have a delay in the OT evaluation that Dr. Mauricio requested and they are required to let us know if it is past 5 days.  Sonia states we can call if we have additional questions.

## 2021-05-10 ENCOUNTER — MEDICAL CORRESPONDENCE (OUTPATIENT)
Dept: HEALTH INFORMATION MANAGEMENT | Facility: CLINIC | Age: 76
End: 2021-05-10

## 2021-05-11 DIAGNOSIS — Z53.9 DIAGNOSIS NOT YET DEFINED: Primary | ICD-10-CM

## 2021-05-12 ENCOUNTER — TELEPHONE (OUTPATIENT)
Dept: WOUND CARE | Facility: CLINIC | Age: 76
End: 2021-05-12

## 2021-05-12 NOTE — TELEPHONE ENCOUNTER
[facility]    Who is the name of the provider?:  Yvonne      What is the location you see this provider at?: [place]    Reason for call:  Please give verbal orders for OT evaluation.      Can we leave a detailed message on this number?  YES

## 2021-05-13 ENCOUNTER — HOSPITAL ENCOUNTER (OUTPATIENT)
Dept: WOUND CARE | Facility: CLINIC | Age: 76
Discharge: HOME | End: 2021-05-13
Attending: SURGERY | Admitting: SURGERY
Payer: COMMERCIAL

## 2021-05-13 VITALS — SYSTOLIC BLOOD PRESSURE: 119 MMHG | HEART RATE: 94 BPM | TEMPERATURE: 96 F | DIASTOLIC BLOOD PRESSURE: 74 MMHG

## 2021-05-13 DIAGNOSIS — L89.623 PRESSURE INJURY OF LEFT HEEL, STAGE 3 (H): ICD-10-CM

## 2021-05-13 PROCEDURE — 17250 CHEM CAUT OF GRANLTJ TISSUE: CPT | Performed by: SURGERY

## 2021-05-13 PROCEDURE — 11042 DBRDMT SUBQ TIS 1ST 20SQCM/<: CPT | Performed by: SURGERY

## 2021-05-13 PROCEDURE — 11042 DBRDMT SUBQ TIS 1ST 20SQCM/<: CPT

## 2021-05-13 NOTE — IP AVS SNAPSHOT
After Visit Summary Template Not Found    This [employer] is only intended to be used in the After Visit Summary and can only be used in a report that uses a released After Visit Summary Template.                       MRN:[id number]                      After Visit Summary   5/13/2021    Braxton Mantilla    MRN: [id number]           Visit Information        Provider Department      5/13/2021 10:20 AM Skip Mauricio MD [facility] Wound [facility] Wound          Review of your medicines      UNREVIEWED medicines. Ask your doctor about these medicines       Dose / Directions   acetaminophen 500 MG tablet  Commonly known as: TYLENOL      Dose: 1,000 mg  Take 1,000 mg by mouth 3 times daily as needed for pain  Refills: 0     cyanocobalamin 1000 MCG tablet  Commonly known as: VITAMIN B-12  Used for: PAD (peripheral artery disease) (H)      Dose: 1,000 mcg  Take 1 tablet (1,000 mcg) by mouth daily  Quantity: 90 tablet  Refills: 3     folic acid 400 MCG tablet  Commonly known as: FOLVITE  Used for: PAD (peripheral artery disease) (H)      Dose: 800 mcg  Take 2 tablets (800 mcg) by mouth daily  Quantity: 90 tablet  Refills: 3     gabapentin 100 MG capsule  Commonly known as: NEURONTIN  Used for: Closed displaced intertrochanteric fracture of left femur, initial encounter (H)      Dose: 100 mg  Take 1 capsule (100 mg) by mouth 3 times daily  Quantity: 90 capsule  Refills: 0     levothyroxine 112 MCG tablet  Commonly known as: SYNTHROID/LEVOTHROID      Dose: 112 mcg  Take 112 mcg by mouth daily  Refills: 0     methocarbamol 500 MG tablet  Commonly known as: ROBAXIN      TAKE ONE TABLET BY MOUTH TWICE A DAY AS NEEDED FOR BACK SPASM  Refills: 0     metoprolol succinate ER 25 MG 24 hr tablet  Commonly known as: TOPROL-XL      Dose: 25 mg  Take 25 mg by mouth daily  Refills: 0     oxyCODONE 5 MG tablet  Commonly known as: ROXICODONE  Used for: Closed displaced intertrochanteric fracture of left femur, initial  encounter (H)      Dose: 5 mg  Take 1 tablet (5 mg) by mouth 3 times daily as needed for pain or severe pain  Quantity: 15 tablet  Refills: 0     rivaroxaban ANTICOAGULANT 20 MG Tabs tablet  Commonly known as: XARELTO      Dose: 20 mg  Take 20 mg by mouth daily (with dinner)  Refills: 0     sildenafil 100 MG tablet  Commonly known as: VIAGRA  Indication: Erectile Dysfunction      Dose: 100 mg  Take 100 mg by mouth daily as needed On hold at TCU  Refills: 0     simvastatin 80 MG tablet  Commonly known as: ZOCOR      Take by mouth At Bedtime  Refills: 0     tamsulosin 0.4 MG capsule  Commonly known as: FLOMAX      TAKE ONE CAPSULE BY MOUTH EVERY DAY FOR PROSTATE  Refills: 0     traZODone 50 MG tablet  Commonly known as: DESYREL  Used for: Insomnia, unspecified type      Dose: 25 mg  Take 0.5 tablets (25 mg) by mouth At Bedtime  Quantity: 15 tablet  Refills: 0     urea 10 % external cream  Commonly known as: CARMOL      Apply topically At Bedtime To feet  Refills: 0     * vitamin C 1000 MG Tabs  Commonly known as: ASCORBIC ACID  Used for: PAD (peripheral artery disease) (H)      Dose: 1,000 mg  Take 1 tablet (1,000 mg) by mouth 2 times daily  Quantity: 90 tablet  Refills: 3     * vitamin C 100 MG Chew      Dose: 1,000 mg  Take 1,000 mg by mouth  Refills: 0     vitamin D3 250 mcg (18244 units) capsule  Commonly known as: CHOLECALCIFEROL  Used for: PAD (peripheral artery disease) (H)      Dose: 1 capsule  Take 1 capsule (250 mcg) by mouth daily  Quantity: 90 capsule  Refills: 1         * This list has 2 medication(s) that are the same as other medications prescribed for you. Read the directions carefully, and ask your doctor or other care provider to review them with you.                  Protect others around you: Learn how to safely use, store and throw away your medicines at www.disposemymeds.org.       Follow-ups after your visit       Your next 10 appointments already scheduled    Jun 07, 2021  9:40 AM  Return Visit  "with Skip Mauricio MD  [facility] [place] ([facility] ) [address]  [address]  [facility] [address] [phone]         Care Instructions       Further instructions from your care team          [facility]  [address] [facility] [address], [place] [address]    Call us at [phone] if you have any questions about your wounds, have redness or swelling around your wound, have a fever of 101 or greater or if you have any other problems or concerns. We answer the phone Monday through Friday 8 am to 4 pm, please leave a message as we check the voicemail frequently throughout the day.     Braxton Mantilla      1945    [facility] Phone:[phone] Fax: [phone]  Wound care recommendations to Left Heel: keep foot dry with cast protector for showers  After cleansing with saline or wound cleanser, apply small amount of VASHE on gauze, lay into wound bed, let sit for 15-30 minutes, remove gauze (do not rinse) then apply dressing.  Cover wound with endoform antimicrobial to fill cavity and Hydrofera Blue to cover size of wound, edemawear navy stripe then gauze and roll gauze change dressing MWF or three times a week.  You need to stay off your foot at all times to help heal your wound.  Use BOOT AT ALL TIMES You should wear your offloading boot at all times.  When you sit raise your ankle above your hips to promote wound healing.       MATT Mauricio M.D.. May 13, 2021      If you had a positive experience please indicate on your patient satisfaction survey form that [facility] will be sending you.      Additional Information About Your Visit       [employer] Information    [employer] lets you send messages to your doctor, view your test results, renew your prescriptions, schedule appointments and more. To sign up, go to [email or web address] . Click on \"Log in\" on the left side of the screen, which will take you to " "the Welcome page. Then click on \"Sign up Now\" on the right side of the page.     You will be asked to enter the access code listed below, as well as some personal information. Please follow the directions to create your username and password.     Your access code is: [email or web address]  Expires: 6/15/2021  5:21 AM     Your access code will  in 60 days. If you need help or a new code, please call your   [facility] clinic or [phone].       Care EveryWhere ID    This is your Care EveryWhere ID. This could be used by other organizations to access your [facility] medical records  [id number]       Your Vitals Were  Most recent update: 2021 10:41 AM    Blood Pressure   119/74    Pulse   94    Temperature   96  F (35.6  C) (Temporal)            Primary Care Provider Fax #    Physician No Ref-Primary [phone]      Equal Access to Services    ROGRE MOREL : Hadii shaheed no hadasho Soxinali, waaxda luqadaha, qaybta kaalmada adeegyada, binh dubon . So [facility] [phone].    ATENCIÓN: Si habla español, tiene a fernandez disposición servicios gratuitos de asistencia lingüística. Llame al [phone].    We comply with applicable federal and state civil rights laws, including the [place] Human Rights Act. We do not discriminate on the basis of race, color, creed, Judaism, national origin, marital status, age, disability, sex, sexual orientation, or gender identity.    If you would like an itemization of your charges they will now be available in [employer] 30 days after discharge. To access the itemized statements in [employer] go to billing/billing summary. From there select view account. There will be multiple tabs showing an overview of your account, detail, payments, and communications. From the communications tab you can see your monthly statements, your itemized statements, and any billing letters generated for your account. If you do not have a [employer] account and need help getting " access please contact [employer] at [phone].  If you would prefer to have your itemized statements mailed please contact our automated itemized bill request line at [phone] option  2.       Thank you!    Thank you for choosing [facility] for your care. Our goal is always to provide you with excellent care. Hearing back from our patients is one way we can continue to improve our services. Please take a few minutes to complete the written survey that you may receive in the mail after you visit with us. Thank you!            Medication List      ASK your doctor about these medications          Morning Afternoon Evening Bedtime As Needed    acetaminophen 500 MG tablet  Also known as: TYLENOL  INSTRUCTIONS: Take 1,000 mg by mouth 3 times daily as needed for pain                     cyanocobalamin 1000 MCG tablet  Also known as: VITAMIN B-12  INSTRUCTIONS: Take 1 tablet (1,000 mcg) by mouth daily                     folic acid 400 MCG tablet  Also known as: FOLVITE  INSTRUCTIONS: Take 2 tablets (800 mcg) by mouth daily                     gabapentin 100 MG capsule  Also known as: NEURONTIN  INSTRUCTIONS: Take 1 capsule (100 mg) by mouth 3 times daily                     levothyroxine 112 MCG tablet  Also known as: SYNTHROID/LEVOTHROID  INSTRUCTIONS: Take 112 mcg by mouth daily                     methocarbamol 500 MG tablet  Also known as: ROBAXIN  INSTRUCTIONS: TAKE ONE TABLET BY MOUTH TWICE A DAY AS NEEDED FOR BACK SPASM                     metoprolol succinate ER 25 MG 24 hr tablet  Also known as: TOPROL-XL  INSTRUCTIONS: Take 25 mg by mouth daily                     oxyCODONE 5 MG tablet  Also known as: ROXICODONE  INSTRUCTIONS: Take 1 tablet (5 mg) by mouth 3 times daily as needed for pain or severe pain                     rivaroxaban ANTICOAGULANT 20 MG Tabs tablet  Also known as: XARELTO  INSTRUCTIONS: Take 20 mg by mouth daily (with dinner)                     sildenafil 100 MG tablet  Also known as:  VIAGRA  INSTRUCTIONS: Take 100 mg by mouth daily as needed On hold at [facility]  Reason for med: Erectile Dysfunction                     simvastatin 80 MG tablet  Also known as: ZOCOR  INSTRUCTIONS: Take by mouth At Bedtime                     tamsulosin 0.4 MG capsule  Also known as: FLOMAX  INSTRUCTIONS: TAKE ONE CAPSULE BY MOUTH EVERY DAY FOR PROSTATE                     traZODone 50 MG tablet  Also known as: DESYREL  INSTRUCTIONS: Take 0.5 tablets (25 mg) by mouth At Bedtime                     urea 10 % external cream  Also known as: CARMOL  INSTRUCTIONS: Apply topically At Bedtime To feet                     * vitamin C 1000 MG Tabs  Also known as: ASCORBIC ACID  INSTRUCTIONS: Take 1 tablet (1,000 mg) by mouth 2 times daily                     * vitamin C 100 MG Chew  INSTRUCTIONS: Take 1,000 mg by mouth                     vitamin D3 250 mcg (65531 units) capsule  Also known as: CHOLECALCIFEROL  INSTRUCTIONS: Take 1 capsule (250 mcg) by mouth daily                        * This list has 2 medication(s) that are the same as other medications prescribed for you. Read the directions carefully, and ask your doctor or other care provider to review them with you.

## 2021-05-13 NOTE — DISCHARGE INSTRUCTIONS
[facility]  [address] [address]    Call us at [phone] if you have any questions about your wounds, have redness or swelling around your wound, have a fever of 101 or greater or if you have any other problems or concerns. We answer the phone Monday through Friday 8 am to 4 pm, please leave a message as we check the voicemail frequently throughout the day.     Braxton Mantilla      1945    [facility] Phone:[phone] Fax: [phone]  Wound care recommendations to Left Heel: keep foot dry with cast protector for showers  After cleansing with saline or wound cleanser, apply small amount of VASHE on gauze, lay into wound bed, let sit for 15-30 minutes, remove gauze (do not rinse) then apply dressing.  Cover wound with endoform antimicrobial to fill cavity and Hydrofera Blue to cover size of wound, edemawear navy stripe then gauze and roll gauze change dressing MWF or three times a week.  You need to stay off your foot at all times to help heal your wound.  Use BOOT AT ALL TIMES You should wear your offloading boot at all times.  When you sit raise your ankle above your hips to promote wound healing.       MATT Mauricio M.D.. May 13, 2021      If you had a positive experience please indicate on your patient satisfaction survey form that [facility] will be sending you.

## 2021-05-13 NOTE — PROGRESS NOTES
[facility] Wound Healing [place] Progress Note    Subject: Braxton Mantilla pressure ulceration left heel status post hip fracture, previous total contact cast utilization though patient could not tolerate.    Patient Active Problem List   Diagnosis     Chronic atrial fibrillation (H)     Closed displaced intertrochanteric fracture of left femur, initial encounter (H)     Fall, initial encounter     Knee abrasion, left, initial encounter     Long term current use of anticoagulant therapy     Abdominal aortic aneurysm (AAA) (H)     Astigmatism     Borderline glaucoma     Cataract     Essential hypertension     Excessive sweating     Hereditary and idiopathic peripheral neuropathy     Hypothyroidism     Injury, other and unspecified, knee, leg, ankle, and foot     Other and unspecified hyperlipidemia     Palpitations     Personal history of tobacco use, presenting hazards to health     Presbyopia     Solitary pulmonary nodule     Special screening for other conditions     Tinea pedis     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     Type 2 diabetes mellitus (H)     Pressure injury of left heel, stage 3 (H)     Past Medical History:   Diagnosis Date     Benign essential hypertension      Hypothyroidism      Type 2 diabetes mellitus (H)      Exam:  /74   Pulse 94   Temp 96  F (35.6  C) (Temporal)   Wound (used by OP [facility] only) 02/05/21 1131 Left (Active)   Thickness/Stage Stage 3 05/13/21 1044   Dressing Appearance moist drainage 05/13/21 1044   Base necrotic 05/13/21 1044   Periwound intact 05/13/21 1044   Periwound Temperature warm 05/13/21 1044   Periwound Skin Turgor soft 05/13/21 1044   Edges open 05/13/21 1044   Length (cm) 0.9 05/13/21 1044   Width (cm) 1.5 05/13/21 1044   Depth (cm) 0.5 05/13/21 1044   Wound (cm^2) 1.35 cm^2 05/13/21 1044   Wound Volume (cm^3) 0.68 cm^3 05/13/21 1044   Wound healing % 88.98 05/13/21 1044   Drainage Characteristics/Odor serosanguineous 05/13/21 1044   Drainage Amount moderate  05/13/21 1044   Care, Wound debrided 05/13/21 1044     See photodocumentation, wound debridement of eschar, necrotic tissue, biofilm, no bone or tendon exposure, undermining eliminated.    Procedure:   Patient was determined to be capable of making their own medical decisions and informed consent was obtained. Topical anesthetic of 4% lidocaine was applied, debridement was performed using a #15 blade down to and including subcutaneous tissue, eschar, biofilm, bleeding controlled with light pressure and application of silver nitrate. Patient tolerated procedure well.    Impression: Progressive closure left heel pressure wound    Plan: He is homebound, utilizing an Aircast for minimal ambulation, has home care visits 3 times a week which which should maintain given progress.  Continue current dressing regime, offloading, flat eyes, micronutrients.  .  Patient will return to the clinic in 2 weeks time    Skip Mauricio MD on 5/13/2021 at 11:24 AM

## 2021-05-13 NOTE — ADDENDUM NOTE
Encounter addended by: Skip Mauricio MD on: 5/13/2021 12:47 PM   Actions taken: Charge Capture section accepted

## 2021-06-07 ENCOUNTER — HOSPITAL ENCOUNTER (OUTPATIENT)
Dept: WOUND CARE | Facility: CLINIC | Age: 76
Discharge: HOME | End: 2021-06-07
Attending: SURGERY | Admitting: SURGERY
Payer: COMMERCIAL

## 2021-06-07 VITALS
SYSTOLIC BLOOD PRESSURE: 134 MMHG | TEMPERATURE: 98.9 F | HEART RATE: 108 BPM | RESPIRATION RATE: 20 BRPM | DIASTOLIC BLOOD PRESSURE: 66 MMHG

## 2021-06-07 DIAGNOSIS — L89.623 PRESSURE INJURY OF LEFT HEEL, STAGE 3 (H): ICD-10-CM

## 2021-06-07 PROBLEM — B95.61 METHICILLIN SUSCEPTIBLE STAPHYLOCOCCUS AUREUS INFECTION AS THE CAUSE OF DISEASES CLASSIFIED ELSEWHERE: Status: ACTIVE | Noted: 2021-02-01

## 2021-06-07 PROBLEM — C44.719 BASAL CELL CARCINOMA OF SKIN OF LEFT LOWER LIMB, INCLUDING HIP: Status: ACTIVE | Noted: 2021-02-17

## 2021-06-07 PROBLEM — K59.01 SLOW TRANSIT CONSTIPATION: Status: ACTIVE | Noted: 2020-12-22

## 2021-06-07 PROBLEM — D64.9 ANEMIA, UNSPECIFIED: Status: ACTIVE | Noted: 2020-12-22

## 2021-06-07 PROBLEM — Z91.81 HISTORY OF FALLING: Status: ACTIVE | Noted: 2020-12-22

## 2021-06-07 PROBLEM — Z87.891 PERSONAL HISTORY OF NICOTINE DEPENDENCE: Status: ACTIVE | Noted: 2020-12-19

## 2021-06-07 PROCEDURE — 99213 OFFICE O/P EST LOW 20 MIN: CPT | Mod: 25 | Performed by: SURGERY

## 2021-06-07 PROCEDURE — 11042 DBRDMT SUBQ TIS 1ST 20SQCM/<: CPT

## 2021-06-07 PROCEDURE — 97597 DBRDMT OPN WND 1ST 20 CM/<: CPT | Performed by: SURGERY

## 2021-06-07 PROCEDURE — 17250 CHEM CAUT OF GRANLTJ TISSUE: CPT | Performed by: SURGERY

## 2021-06-07 PROCEDURE — 11042 DBRDMT SUBQ TIS 1ST 20SQCM/<: CPT | Performed by: SURGERY

## 2021-06-07 PROCEDURE — 11719 TRIM NAIL(S) ANY NUMBER: CPT | Mod: XS | Performed by: SURGERY

## 2021-06-07 NOTE — PROGRESS NOTES
[facility] Wound Healing [place] Progress Note    Subject: Braxton Mantilla stage III pressure ulcer left heel status post left hip fracture, slow progressive improvement, adult-onset diabetes, he is utilizing his offloading boot though could not wear today as he had to drive himself today's appointment.  We have previously tried total contact casting which he did not tolerate.  He denies fevers chills sweats.    Patient Active Problem List   Diagnosis     Chronic atrial fibrillation (H)     Closed displaced intertrochanteric fracture of left femur, initial encounter (H)     Fall, initial encounter     Knee abrasion, left, initial encounter     Long term current use of anticoagulant therapy     Abdominal aortic aneurysm (AAA) (H)     Astigmatism     Borderline glaucoma     Cataract     Essential hypertension     Excessive sweating     Hereditary and idiopathic peripheral neuropathy     Hypothyroidism     Injury, other and unspecified, knee, leg, ankle, and foot     Other and unspecified hyperlipidemia     Palpitations     Personal history of tobacco use, presenting hazards to health     Presbyopia     Solitary pulmonary nodule     Special screening for other conditions     Tinea pedis     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     Type 2 diabetes mellitus (H)     Pressure injury of left heel, stage 3 (H)     Anemia, unspecified     Basal cell carcinoma of skin of left lower limb, including hip     History of falling     Methicillin susceptible Staphylococcus aureus infection as the cause of diseases classified elsewhere     Personal history of nicotine dependence     Slow transit constipation     Past Medical History:   Diagnosis Date     Benign essential hypertension      Hypothyroidism      Type 2 diabetes mellitus (H)      Exam:  /66   Pulse 108   Temp 98.9  F (37.2  C) (Temporal)   Resp 20   Wound (used by OP [facility] only) 02/05/21 1131 Left (Active)   Thickness/Stage Stage 3 06/07/21 0900   Dressing  Appearance dried drainage 06/07/21 0900   Base slough 06/07/21 0900   Periwound intact 06/07/21 0900   Periwound Temperature warm 06/07/21 0900   Periwound Skin Turgor soft 06/07/21 0900   Edges rolled/closed;open 06/07/21 0900   Length (cm) 0.2 06/07/21 0900   Width (cm) 1.3 06/07/21 0900   Depth (cm) 0.3 06/07/21 0900   Wound (cm^2) 0.26 cm^2 06/07/21 0900   Wound Volume (cm^3) 0.08 cm^3 06/07/21 0900   Wound healing % 97.88 06/07/21 0900   Drainage Characteristics/Odor serosanguineous 06/07/21 0900   Drainage Amount moderate 06/07/21 0900   Care, Wound debrided 06/07/21 0900     Hypertrophic toenails of the left foot were debrided sharply, left heel ulceration also debrided, no bone or tendon exposure, greater than 90% improvement.    Procedure:   Patient was determined to be capable of making their own medical decisions and informed consent was obtained. Topical anesthetic of 4% lidocaine was applied, debridement was performed using a #15 blade down to and including subcutaneous tissue and biofilm bleeding controlled with light pressure and application of silver nitrate. Patient tolerated procedure well.    Impression: Stage III pressure ulceration left heel status post left hip fracture, adult-onset diabetes    Plan: We will dress the wounds with antimicrobial endoform, edema wear, offloading boot to protect heel, did not previously tolerate total contact casting.  Patient will return to the clinic in 6 weeks time    Skip Mauricio MD on 6/7/2021 at 10:18 AM

## 2021-06-07 NOTE — DISCHARGE INSTRUCTIONS
[facility]  [address] [address]    Call us at [phone] if you have any questions about your wounds, have redness or swelling around your wound, have a fever of 101 or greater or if you have any other problems or concerns. We answer the phone Monday through Friday 8 am to 4 pm, please leave a message as we check the voicemail frequently throughout the day.     Braxton Mantilla      1945    [facility] Phone:[phone] Fax: [phone]  Wound care recommendations to Left Heel: keep foot dry with cast protector for showers  After cleansing with saline or wound cleanser, apply small amount of VASHE on gauze, lay into wound bed, let sit for 15-30 minutes, remove gauze (do not rinse) then apply dressing.  Cover wound with endoform antimicrobial to fill cavity then edemawear navy stripe then gauze and roll gauze change dressing MWF or three times a week.  Compression:   You have a compression wrap Spandigrip E is supposed to be removed at night and put back on first thing in the morning.   Please remove compression dressing if toes turn blue and/or tingle and can not be relieved by raising the leg for one hour.     You need to stay off your foot at all times to help heal your wound.  Use BOOT AT ALL TIMES You should wear your offloading boot at all times.  When you sit raise your ankle above your hips to promote wound healing.       MATT Mauricio M.D.. June 7, 2021      If you had a positive experience please indicate on your patient satisfaction survey form that [facility] will be sending you.    If you have any billing related questions please call the [facility] office at [phone]. The clinic staff does not handle billing related matters.

## 2021-06-09 ENCOUNTER — TELEPHONE (OUTPATIENT)
Dept: WOUND CARE | Facility: CLINIC | Age: 76
End: 2021-06-09

## 2021-06-09 NOTE — TELEPHONE ENCOUNTER
Rhiannon returned call to clinic. New orders reviewed with Rhiannon and verbal orders given for home care. No further questions or concerns.

## 2021-06-24 ENCOUNTER — TELEPHONE (OUTPATIENT)
Dept: WOUND CARE | Facility: CLINIC | Age: 76
End: 2021-06-24

## 2021-06-24 NOTE — TELEPHONE ENCOUNTER
Call returned to Rhiannon. She reports the pt is no longer home bound. He is unable to change the dressing himself and he does not have anyone to change the dressing. Discussed with Dr. Mauricio who states to have pt come to our clinic for a 2 layer wrap with weekly changes. Rhiannon will last see pt on 6/26. Will call pt to set up weekly dressing changes.

## 2021-06-24 NOTE — TELEPHONE ENCOUNTER
Rhiannon from [facility] care called to say Braxton is not homebound and she will not be able to continue to see the patient and he will have to come to the clinic for dressing changes.   Call her at .

## 2021-06-26 ENCOUNTER — MEDICAL CORRESPONDENCE (OUTPATIENT)
Dept: HEALTH INFORMATION MANAGEMENT | Facility: CLINIC | Age: 76
End: 2021-06-26

## 2021-06-29 ENCOUNTER — HOSPITAL ENCOUNTER (OUTPATIENT)
Dept: WOUND CARE | Facility: CLINIC | Age: 76
Discharge: HOME | End: 2021-06-29
Attending: SURGERY | Admitting: SURGERY
Payer: COMMERCIAL

## 2021-06-29 VITALS
RESPIRATION RATE: 18 BRPM | TEMPERATURE: 98.2 F | HEART RATE: 116 BPM | SYSTOLIC BLOOD PRESSURE: 137 MMHG | DIASTOLIC BLOOD PRESSURE: 82 MMHG

## 2021-06-29 DIAGNOSIS — L89.623 PRESSURE INJURY OF LEFT HEEL, STAGE 3 (H): ICD-10-CM

## 2021-06-29 PROCEDURE — 97597 DBRDMT OPN WND 1ST 20 CM/<: CPT | Performed by: SURGERY

## 2021-06-29 PROCEDURE — 99213 OFFICE O/P EST LOW 20 MIN: CPT | Performed by: SURGERY

## 2021-06-29 RX ORDER — LISINOPRIL 40 MG/1
TABLET ORAL
COMMUNITY
Start: 2021-06-01

## 2021-06-29 RX ORDER — ATORVASTATIN CALCIUM 80 MG/1
TABLET, FILM COATED ORAL
COMMUNITY
Start: 2021-06-01

## 2021-06-29 RX ORDER — AMLODIPINE BESYLATE 10 MG/1
TABLET ORAL
COMMUNITY
Start: 2021-06-01

## 2021-06-29 NOTE — DISCHARGE INSTRUCTIONS
[facility]  [address] [facility] [address] [place] [address]  Appointment Phone [phone]     Braxton Mantilla      1945  Your wound is Healed!! Dressings are no longer required.   Apply Gold Bond Lotion to foot daily.        MATT Mauricio M.D.. June 29, 2021  Take special care  Here are tips for taking special care of your feet:    Inspect your feet daily for problems such as redness, blisters, cracks, dry skin, or numbness. Use a mirror to see the bottoms of your feet. Or, ask for help.    Manage your diabetes. Monitor and control your blood sugar. Take all your medicines as prescribed.    Avoid walking barefoot, even indoors. Always wear socks inside your shoes.     Wash your feet with warm water and mild soap. Dry well, especially between toes.    Don t treat corns or calluses yourself. Talk to your healthcare provider or podiatrist (a healthcare provider who specializes in foot care) if you need assistance trimming your toenails.    Use moisturizing cream or lotion if you have dry skin, but don t use it between toes.    Don t use heating pads on your feet. If you have neuropathy, you could get a burn and not feel it.    Stop smoking. Smoking restricts blood flow and can make it harder for wounds to heal.    Don't use sharp blades to trim your nails. Use a nail clipper and file instead.   Have regular checkups  Foot problems can develop quickly. So be sure to follow your healthcare team s schedule for regular checkups. During office visits, take off your shoes and socks as soon as you get in the exam room. Ask your healthcare provider to examine your feet for problems. This will make it easier to find and treat small skin irritations before they get worse. Regular checkups can also help keep track of the blood flow and feeling in your feet. If you have neuropathy, you may need to have checkups more often.  Wear proper footwear  Wearing proper footwear is very important. If  areas of your feet have been damaged by too much pressure, your healthcare provider may recommend changing your footwear. In some cases, avoiding high heels or tight work boots may be all that s needed. Or, your healthcare provider may recommend special shoes or custom inserts. These help protect your feet and keep existing irritations from getting worse. If you need special footwear, ask your healthcare provider if you qualify for Medicare's custom-molded and extra-depth diabetic shoe and insert program.   Make sure shoes and socks fit  Any pair of shoes--new or old--should feel comfortable as soon as you put them on. There shouldn t be any rubbing when you walk. Wear the right shoe for any activity. For instance, a running shoe is designed to keep your feet injury-free while jogging. Buy shoes at the end of the day, when your feet are larger. Make sure they provide support without feeling too loose. Make sure your socks fit, too. Wear soft, seamless, well-padded socks for activity. Cotton or microfiber socks are best to help to absorb sweat. To protect your feet, avoid shoes that are open-toed or open-heeled. If you have questions about what kinds of shoes and socks are best, talk to your healthcare team.

## 2021-06-29 NOTE — PROGRESS NOTES
[facility] Wound Healing [place] Progress Note    Subject: Braxton Mantilla pressure ulcer left heel, stage II, near completely resolved, status post repair of left hip after fracture.  Is not homebound.  No tobacco use.    Patient Active Problem List   Diagnosis     Chronic atrial fibrillation (H)     Closed displaced intertrochanteric fracture of left femur, initial encounter (H)     Fall, initial encounter     Knee abrasion, left, initial encounter     Long term current use of anticoagulant therapy     Abdominal aortic aneurysm (AAA) (H)     Astigmatism     Borderline glaucoma     Cataract     Essential hypertension     Excessive sweating     Hereditary and idiopathic peripheral neuropathy     Hypothyroidism     Injury, other and unspecified, knee, leg, ankle, and foot     Other and unspecified hyperlipidemia     Palpitations     Personal history of tobacco use, presenting hazards to health     Presbyopia     Solitary pulmonary nodule     Special screening for other conditions     Tinea pedis     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     Type 2 diabetes mellitus (H)     Pressure injury of left heel, stage 3 (H)     Anemia, unspecified     Basal cell carcinoma of skin of left lower limb, including hip     History of falling     Methicillin susceptible Staphylococcus aureus infection as the cause of diseases classified elsewhere     Personal history of nicotine dependence     Slow transit constipation     Past Medical History:   Diagnosis Date     Benign essential hypertension      Hypothyroidism      Type 2 diabetes mellitus (H)      Exam:  /82 (BP Location: Left arm)   Pulse 116   Temp 98.2  F (36.8  C) (Temporal)   Resp 18      See photodocumentation wound measurements, small wound of eschar left lateral heel, no cellulitis, no pain, can ambulate without difficulty.        Impression: Stage II pressure ulcer left lateral heel status post repair of fractured left hip    Plan: Edema wear, moisturizing  lotion, clean foot on a daily basis  Patient will return to the clinic in 8 weeks time    Skip Mauricio MD on 6/29/2021 at 9:17 AM

## 2021-08-24 ENCOUNTER — HOSPITAL ENCOUNTER (OUTPATIENT)
Dept: WOUND CARE | Facility: CLINIC | Age: 76
Discharge: HOME | End: 2021-08-24
Attending: SURGERY | Admitting: SURGERY
Payer: COMMERCIAL

## 2021-08-24 VITALS — SYSTOLIC BLOOD PRESSURE: 134 MMHG | TEMPERATURE: 98.2 F | DIASTOLIC BLOOD PRESSURE: 67 MMHG | HEART RATE: 119 BPM

## 2021-08-24 DIAGNOSIS — L89.623 PRESSURE INJURY OF LEFT HEEL, STAGE 3 (H): ICD-10-CM

## 2021-08-24 PROCEDURE — 99213 OFFICE O/P EST LOW 20 MIN: CPT | Mod: 25 | Performed by: SURGERY

## 2021-08-24 PROCEDURE — 11055 PARING/CUTG B9 HYPRKER LES 1: CPT

## 2021-08-24 PROCEDURE — 17250 CHEM CAUT OF GRANLTJ TISSUE: CPT | Performed by: SURGERY

## 2021-08-24 PROCEDURE — 97597 DBRDMT OPN WND 1ST 20 CM/<: CPT | Performed by: SURGERY

## 2021-08-24 PROCEDURE — 11719 TRIM NAIL(S) ANY NUMBER: CPT

## 2021-08-24 PROCEDURE — 11719 TRIM NAIL(S) ANY NUMBER: CPT | Performed by: SURGERY

## 2021-08-24 NOTE — PROGRESS NOTES
[facility] Wound Healing [place] Progress Note    Subject: Braxton Mantilla chronic left lateral heel wound after hip fracture, wound closed.    Patient Active Problem List   Diagnosis     Chronic atrial fibrillation (H)     Closed displaced intertrochanteric fracture of left femur, initial encounter (H)     Fall, initial encounter     Knee abrasion, left, initial encounter     Long term current use of anticoagulant therapy     Abdominal aortic aneurysm (AAA) (H)     Astigmatism     Borderline glaucoma     Cataract     Essential hypertension     Excessive sweating     Hereditary and idiopathic peripheral neuropathy     Hypothyroidism     Injury, other and unspecified, knee, leg, ankle, and foot     Other and unspecified hyperlipidemia     Palpitations     Personal history of tobacco use, presenting hazards to health     Presbyopia     Solitary pulmonary nodule     Special screening for other conditions     Tinea pedis     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     Closed wound left lateral heel, hypertrophic callus was debrided with a 15 blade, markedly hypertrophic toenails of the left and right feet were debrided.  Over nitrate utilized at left lateral heel wound, wound closed.        Impression: Closed left lateral heel wound status post left hip fracture, pressure ulcer    Plan: Application of urea cream twice daily for control of hypertrophic tissue present  bilaterally, adult-onset diabetes, will follow up as needed.    Skip Mauricio MD on 8/24/2021 at 10:12 AM

## 2021-08-24 NOTE — DISCHARGE INSTRUCTIONS
[facility]  [address] [facility] [address] [place] [address]  Appointment Phone [phone]     Braxton Mantilla      1945  Your wound is Healed!! Dressings are no longer required.   Apply Urea Cream to foot twice daily.      MATT Mauricio M.D.. August 24, 2021  Take special care  Here are tips for taking special care of your feet:    Inspect your feet daily for problems such as redness, blisters, cracks, dry skin, or numbness. Use a mirror to see the bottoms of your feet. Or, ask for help.    Manage your diabetes. Monitor and control your blood sugar. Take all your medicines as prescribed.    Avoid walking barefoot, even indoors. Always wear socks inside your shoes.     Wash your feet with warm water and mild soap. Dry well, especially between toes.    Don t treat corns or calluses yourself. Talk to your healthcare provider or podiatrist (a healthcare provider who specializes in foot care) if you need assistance trimming your toenails.    Use moisturizing cream or lotion if you have dry skin, but don t use it between toes.    Don t use heating pads on your feet. If you have neuropathy, you could get a burn and not feel it.    Stop smoking. Smoking restricts blood flow and can make it harder for wounds to heal.    Don't use sharp blades to trim your nails. Use a nail clipper and file instead.   Have regular checkups  Foot problems can develop quickly. So be sure to follow your healthcare team s schedule for regular checkups. During office visits, take off your shoes and socks as soon as you get in the exam room. Ask your healthcare provider to examine your feet for problems. This will make it easier to find and treat small skin irritations before they get worse. Regular checkups can also help keep track of the blood flow and feeling in your feet. If you have neuropathy, you may need to have checkups more often.  Wear proper footwear  Wearing proper footwear is very important. If  areas of your feet have been damaged by too much pressure, your healthcare provider may recommend changing your footwear. In some cases, avoiding high heels or tight work boots may be all that s needed. Or, your healthcare provider may recommend special shoes or custom inserts. These help protect your feet and keep existing irritations from getting worse. If you need special footwear, ask your healthcare provider if you qualify for Medicare's custom-molded and extra-depth diabetic shoe and insert program.   Make sure shoes and socks fit  Any pair of shoes--new or old--should feel comfortable as soon as you put them on. There shouldn t be any rubbing when you walk. Wear the right shoe for any activity. For instance, a running shoe is designed to keep your feet injury-free while jogging. Buy shoes at the end of the day, when your feet are larger. Make sure they provide support without feeling too loose. Make sure your socks fit, too. Wear soft, seamless, well-padded socks for activity. Cotton or microfiber socks are best to help to absorb sweat. To protect your feet, avoid shoes that are open-toed or open-heeled. If you have questions about what kinds of shoes and socks are best, talk to your healthcare team.

## 2025-06-04 ENCOUNTER — LAB REQUISITION (OUTPATIENT)
Dept: LAB | Facility: CLINIC | Age: 80
End: 2025-06-04
Payer: MEDICARE

## 2025-06-04 DIAGNOSIS — Z11.52 ENCOUNTER FOR SCREENING FOR COVID-19: ICD-10-CM

## 2025-06-04 PROCEDURE — 87635 SARS-COV-2 COVID-19 AMP PRB: CPT | Performed by: REGISTERED NURSE

## 2025-06-05 LAB
SARS-COV-2 RNA RESP QL NAA+PROBE: NEGATIVE
SARS-COV-2 RNA RESP QL NAA+PROBE: NEGATIVE

## 2025-06-08 PROCEDURE — 87635 SARS-COV-2 COVID-19 AMP PRB: CPT | Performed by: REGISTERED NURSE

## 2025-06-09 LAB
SARS-COV-2 RNA RESP QL NAA+PROBE: NEGATIVE
SARS-COV-2 RNA RESP QL NAA+PROBE: NEGATIVE

## 2025-07-14 ENCOUNTER — LAB REQUISITION (OUTPATIENT)
Dept: LAB | Facility: CLINIC | Age: 80
End: 2025-07-14
Payer: MEDICARE

## 2025-07-14 DIAGNOSIS — Z79.899 OTHER LONG TERM (CURRENT) DRUG THERAPY: ICD-10-CM

## 2025-07-15 LAB
ANION GAP SERPL CALCULATED.3IONS-SCNC: 12 MMOL/L (ref 7–15)
BUN SERPL-MCNC: 19.8 MG/DL (ref 8–23)
CALCIUM SERPL-MCNC: 9.2 MG/DL (ref 8.8–10.4)
CHLORIDE SERPL-SCNC: 104 MMOL/L (ref 98–107)
CREAT SERPL-MCNC: 0.96 MG/DL (ref 0.67–1.17)
EGFRCR SERPLBLD CKD-EPI 2021: 81 ML/MIN/1.73M2
GLUCOSE SERPL-MCNC: 99 MG/DL (ref 70–99)
HCO3 SERPL-SCNC: 25 MMOL/L (ref 22–29)
MAGNESIUM SERPL-MCNC: 2 MG/DL (ref 1.7–2.3)
POTASSIUM SERPL-SCNC: 3.9 MMOL/L (ref 3.4–5.3)
SODIUM SERPL-SCNC: 141 MMOL/L (ref 135–145)
VIT D+METAB SERPL-MCNC: 51 NG/ML (ref 20–50)

## 2025-07-15 PROCEDURE — 82947 ASSAY GLUCOSE BLOOD QUANT: CPT | Performed by: FAMILY MEDICINE

## 2025-07-15 PROCEDURE — 83735 ASSAY OF MAGNESIUM: CPT | Performed by: FAMILY MEDICINE

## 2025-07-15 PROCEDURE — 82306 VITAMIN D 25 HYDROXY: CPT | Performed by: FAMILY MEDICINE

## 2025-07-15 PROCEDURE — P9604 ONE-WAY ALLOW PRORATED TRIP: HCPCS | Mod: LTC | Performed by: FAMILY MEDICINE

## 2025-07-15 PROCEDURE — 36415 COLL VENOUS BLD VENIPUNCTURE: CPT | Performed by: FAMILY MEDICINE

## 2025-07-15 PROCEDURE — 80048 BASIC METABOLIC PNL TOTAL CA: CPT | Performed by: FAMILY MEDICINE

## 2025-08-11 ENCOUNTER — LAB REQUISITION (OUTPATIENT)
Dept: LAB | Facility: CLINIC | Age: 80
End: 2025-08-11
Payer: MEDICARE

## 2025-08-11 DIAGNOSIS — D64.9 ANEMIA, UNSPECIFIED: ICD-10-CM

## 2025-08-11 DIAGNOSIS — E89.0 POSTPROCEDURAL HYPOTHYROIDISM: ICD-10-CM

## 2025-08-11 DIAGNOSIS — E78.5 HYPERLIPIDEMIA, UNSPECIFIED: ICD-10-CM

## 2025-08-12 LAB
TSH SERPL DL<=0.005 MIU/L-ACNC: 1.79 UIU/ML (ref 0.3–4.2)
VIT B12 SERPL-MCNC: 1771 PG/ML (ref 232–1245)

## (undated) DEVICE — LINEN TOWEL PACK X5 5464

## (undated) DEVICE — DRAPE COVER C-ARM SEAMLESS SNAP-KAP 03-KP26 LATEX FREE

## (undated) DEVICE — SU VICRYL 0 CT-1 27" J340H

## (undated) DEVICE — PREP CHLORAPREP 26ML TINTED ORANGE  260815

## (undated) DEVICE — KIT PATIENT CARE HANA TABLE PROFX SUPINE 6855

## (undated) DEVICE — ESU GROUND PAD UNIVERSAL W/O CORD

## (undated) DEVICE — SOL NACL 0.9% IRRIG 1000ML BOTTLE 2F7124

## (undated) DEVICE — DRILL BIT QUICK COUPLING 3 FLUTE 4.2MMX330/100MM CALIBRATE

## (undated) DEVICE — GLOVE PROTEXIS MICRO 8.5  2D73PM85

## (undated) DEVICE — GOWN IMPERVIOUS SPECIALTY XL/XLONG 39049

## (undated) DEVICE — SU VICRYL 2-0 CT-2 27" UND J269H

## (undated) DEVICE — BLADE CLIPPER 4412A

## (undated) DEVICE — SUCTION MANIFOLD NEPTUNE SGL

## (undated) DEVICE — WIRE GUIDE 3.2X400MM  357.399

## (undated) DEVICE — Device

## (undated) DEVICE — SU VICRYL 2-0 FS-1 27" UND  J443H

## (undated) DEVICE — SU VICRYL 0 CT-1 27" UND J260H

## (undated) DEVICE — MANIFOLD NEPTUNE 4 PORT 700-20

## (undated) DEVICE — DRAPE C-ARMOR 5 SIDED 5523

## (undated) DEVICE — DRAPE C-ARM 60X42" 1013

## (undated) DEVICE — DRSG XEROFORM 1X8"

## (undated) DEVICE — DRSG TEGADERM 6X8" 1628

## (undated) DEVICE — SOL WATER IRRIG 1000ML BOTTLE 2F7114

## (undated) DEVICE — STPL SKIN 35W ROTATING HEAD PRW35

## (undated) RX ORDER — FENTANYL CITRATE 50 UG/ML
INJECTION, SOLUTION INTRAMUSCULAR; INTRAVENOUS
Status: DISPENSED
Start: 2020-12-20

## (undated) RX ORDER — CEFAZOLIN SODIUM 2 G/100ML
INJECTION, SOLUTION INTRAVENOUS
Status: DISPENSED
Start: 2020-12-20

## (undated) RX ORDER — HYDROMORPHONE HYDROCHLORIDE 1 MG/ML
INJECTION, SOLUTION INTRAMUSCULAR; INTRAVENOUS; SUBCUTANEOUS
Status: DISPENSED
Start: 2020-12-20

## (undated) RX ORDER — METOPROLOL TARTRATE 1 MG/ML
INJECTION, SOLUTION INTRAVENOUS
Status: DISPENSED
Start: 2020-12-20

## (undated) RX ORDER — PROPOFOL 10 MG/ML
INJECTION, EMULSION INTRAVENOUS
Status: DISPENSED
Start: 2020-12-20

## (undated) RX ORDER — LIDOCAINE HYDROCHLORIDE 20 MG/ML
INJECTION, SOLUTION EPIDURAL; INFILTRATION; INTRACAUDAL; PERINEURAL
Status: DISPENSED
Start: 2020-12-20